# Patient Record
Sex: MALE | Race: WHITE | NOT HISPANIC OR LATINO | Employment: FULL TIME | URBAN - METROPOLITAN AREA
[De-identification: names, ages, dates, MRNs, and addresses within clinical notes are randomized per-mention and may not be internally consistent; named-entity substitution may affect disease eponyms.]

---

## 2017-01-01 ENCOUNTER — HOSPITAL ENCOUNTER (OUTPATIENT)
Dept: NON INVASIVE DIAGNOSTICS | Facility: CLINIC | Age: 52
Discharge: HOME | End: 2017-05-26
Payer: COMMERCIAL

## 2017-01-01 ENCOUNTER — HOSPITAL ENCOUNTER (OUTPATIENT)
Dept: INFUSION CENTER | Facility: CLINIC | Age: 52
Discharge: HOME | End: 2017-01-17
Payer: COMMERCIAL

## 2017-01-01 ENCOUNTER — TRANSCRIBE ORDERS (OUTPATIENT)
Dept: ADMINISTRATIVE | Facility: HOSPITAL | Age: 52
End: 2017-01-01

## 2017-01-01 ENCOUNTER — ALLSCRIPTS OFFICE VISIT (OUTPATIENT)
Dept: OTHER | Facility: OTHER | Age: 52
End: 2017-01-01

## 2017-01-01 ENCOUNTER — HOSPITAL ENCOUNTER (OUTPATIENT)
Dept: INFUSION CENTER | Facility: CLINIC | Age: 52
Discharge: HOME | End: 2017-06-29
Payer: COMMERCIAL

## 2017-01-01 ENCOUNTER — APPOINTMENT (OUTPATIENT)
Dept: LAB | Facility: MEDICAL CENTER | Age: 52
End: 2017-01-01
Payer: COMMERCIAL

## 2017-01-01 ENCOUNTER — APPOINTMENT (INPATIENT)
Dept: RADIOLOGY | Facility: HOSPITAL | Age: 52
DRG: 871 | End: 2017-01-01
Payer: COMMERCIAL

## 2017-01-01 ENCOUNTER — HOSPITAL ENCOUNTER (OUTPATIENT)
Dept: CT IMAGING | Facility: HOSPITAL | Age: 52
Discharge: HOME | End: 2017-03-17
Payer: COMMERCIAL

## 2017-01-01 ENCOUNTER — HOSPITAL ENCOUNTER (OUTPATIENT)
Dept: INFUSION CENTER | Facility: CLINIC | Age: 52
Discharge: HOME | End: 2017-07-12
Payer: COMMERCIAL

## 2017-01-01 ENCOUNTER — HOSPITAL ENCOUNTER (OUTPATIENT)
Dept: INFUSION CENTER | Facility: CLINIC | Age: 52
Discharge: HOME | End: 2017-04-27
Payer: COMMERCIAL

## 2017-01-01 ENCOUNTER — HOSPITAL ENCOUNTER (OUTPATIENT)
Dept: INFUSION CENTER | Facility: CLINIC | Age: 52
Discharge: HOME | End: 2017-05-23
Payer: COMMERCIAL

## 2017-01-01 ENCOUNTER — APPOINTMENT (OUTPATIENT)
Dept: LAB | Facility: CLINIC | Age: 52
End: 2017-01-01
Payer: COMMERCIAL

## 2017-01-01 ENCOUNTER — APPOINTMENT (EMERGENCY)
Dept: RADIOLOGY | Facility: HOSPITAL | Age: 52
DRG: 871 | End: 2017-01-01
Payer: COMMERCIAL

## 2017-01-01 ENCOUNTER — APPOINTMENT (OUTPATIENT)
Dept: RADIATION ONCOLOGY | Facility: HOSPITAL | Age: 52
End: 2017-01-01
Payer: COMMERCIAL

## 2017-01-01 ENCOUNTER — HOSPITAL ENCOUNTER (OUTPATIENT)
Dept: INFUSION CENTER | Facility: CLINIC | Age: 52
Discharge: HOME | End: 2017-05-09
Payer: COMMERCIAL

## 2017-01-01 ENCOUNTER — GENERIC CONVERSION - ENCOUNTER (OUTPATIENT)
Dept: OTHER | Facility: OTHER | Age: 52
End: 2017-01-01

## 2017-01-01 ENCOUNTER — APPOINTMENT (EMERGENCY)
Dept: CT IMAGING | Facility: HOSPITAL | Age: 52
DRG: 064 | End: 2017-01-01
Payer: COMMERCIAL

## 2017-01-01 ENCOUNTER — TRANSCRIBE ORDERS (OUTPATIENT)
Dept: LAB | Facility: CLINIC | Age: 52
End: 2017-01-01

## 2017-01-01 ENCOUNTER — APPOINTMENT (OUTPATIENT)
Dept: RADIATION ONCOLOGY | Facility: HOSPITAL | Age: 52
End: 2017-01-01
Attending: RADIOLOGY
Payer: COMMERCIAL

## 2017-01-01 ENCOUNTER — HOSPITAL ENCOUNTER (OUTPATIENT)
Dept: INFUSION CENTER | Facility: CLINIC | Age: 52
Discharge: HOME | End: 2017-04-25
Payer: COMMERCIAL

## 2017-01-01 ENCOUNTER — HOSPITAL ENCOUNTER (OUTPATIENT)
Dept: MRI IMAGING | Facility: HOSPITAL | Age: 52
Discharge: HOME | End: 2017-03-23
Payer: COMMERCIAL

## 2017-01-01 ENCOUNTER — HOSPITAL ENCOUNTER (OUTPATIENT)
Dept: CT IMAGING | Facility: HOSPITAL | Age: 52
Discharge: HOME | End: 2017-05-15
Payer: COMMERCIAL

## 2017-01-01 ENCOUNTER — HOSPITAL ENCOUNTER (OUTPATIENT)
Dept: INFUSION CENTER | Facility: CLINIC | Age: 52
Discharge: HOME | End: 2017-07-26
Payer: COMMERCIAL

## 2017-01-01 ENCOUNTER — HOSPITAL ENCOUNTER (OUTPATIENT)
Dept: INFUSION CENTER | Facility: CLINIC | Age: 52
Discharge: HOME | End: 2017-02-14
Payer: COMMERCIAL

## 2017-01-01 ENCOUNTER — HOSPITAL ENCOUNTER (INPATIENT)
Facility: HOSPITAL | Age: 52
LOS: 1 days | DRG: 064 | End: 2017-08-12
Attending: EMERGENCY MEDICINE | Admitting: HOSPITALIST
Payer: COMMERCIAL

## 2017-01-01 ENCOUNTER — HOSPITAL ENCOUNTER (OUTPATIENT)
Dept: INFUSION CENTER | Facility: CLINIC | Age: 52
Discharge: HOME | End: 2017-01-05
Payer: COMMERCIAL

## 2017-01-01 ENCOUNTER — HOSPITAL ENCOUNTER (OUTPATIENT)
Dept: INFUSION CENTER | Facility: CLINIC | Age: 52
Discharge: HOME | End: 2017-01-31
Payer: COMMERCIAL

## 2017-01-01 ENCOUNTER — HOSPITAL ENCOUNTER (OUTPATIENT)
Dept: INFUSION CENTER | Facility: CLINIC | Age: 52
Discharge: HOME | End: 2017-06-08
Payer: COMMERCIAL

## 2017-01-01 ENCOUNTER — HOSPITAL ENCOUNTER (OUTPATIENT)
Dept: INFUSION CENTER | Facility: CLINIC | Age: 52
Discharge: HOME | End: 2017-03-13
Payer: COMMERCIAL

## 2017-01-01 ENCOUNTER — HOSPITAL ENCOUNTER (OUTPATIENT)
Dept: RADIOLOGY | Facility: HOSPITAL | Age: 52
Discharge: HOME | End: 2017-08-02
Attending: FAMILY MEDICINE
Payer: COMMERCIAL

## 2017-01-01 ENCOUNTER — HOSPITAL ENCOUNTER (OUTPATIENT)
Dept: INFUSION CENTER | Facility: CLINIC | Age: 52
Discharge: HOME | End: 2017-08-09
Payer: COMMERCIAL

## 2017-01-01 ENCOUNTER — HOSPITAL ENCOUNTER (OUTPATIENT)
Dept: CT IMAGING | Facility: HOSPITAL | Age: 52
Discharge: HOME | End: 2017-08-07
Attending: FAMILY MEDICINE
Payer: COMMERCIAL

## 2017-01-01 ENCOUNTER — HOSPITAL ENCOUNTER (OUTPATIENT)
Dept: INFUSION CENTER | Facility: CLINIC | Age: 52
Discharge: HOME | End: 2017-02-28
Payer: COMMERCIAL

## 2017-01-01 ENCOUNTER — HOSPITAL ENCOUNTER (OUTPATIENT)
Dept: MRI IMAGING | Facility: HOSPITAL | Age: 52
Discharge: HOME | End: 2017-03-17
Payer: COMMERCIAL

## 2017-01-01 ENCOUNTER — HOSPITAL ENCOUNTER (OUTPATIENT)
Dept: INFUSION CENTER | Facility: CLINIC | Age: 52
Discharge: HOME | End: 2017-06-06
Payer: COMMERCIAL

## 2017-01-01 ENCOUNTER — HOSPITAL ENCOUNTER (OUTPATIENT)
Dept: INFUSION CENTER | Facility: CLINIC | Age: 52
Discharge: HOME | End: 2017-06-27
Payer: COMMERCIAL

## 2017-01-01 ENCOUNTER — HOSPITAL ENCOUNTER (OUTPATIENT)
Dept: NON INVASIVE DIAGNOSTICS | Facility: CLINIC | Age: 52
Discharge: HOME | End: 2017-02-02
Payer: COMMERCIAL

## 2017-01-01 ENCOUNTER — HOSPITAL ENCOUNTER (OUTPATIENT)
Dept: MRI IMAGING | Facility: HOSPITAL | Age: 52
Discharge: HOME | End: 2017-06-10
Attending: RADIOLOGY
Payer: COMMERCIAL

## 2017-01-01 ENCOUNTER — HOSPITAL ENCOUNTER (INPATIENT)
Facility: HOSPITAL | Age: 52
LOS: 5 days | Discharge: HOME | DRG: 871 | End: 2017-07-15
Attending: EMERGENCY MEDICINE | Admitting: INTERNAL MEDICINE
Payer: COMMERCIAL

## 2017-01-01 ENCOUNTER — APPOINTMENT (INPATIENT)
Dept: CT IMAGING | Facility: HOSPITAL | Age: 52
DRG: 871 | End: 2017-01-01
Payer: COMMERCIAL

## 2017-01-01 ENCOUNTER — HOSPITAL ENCOUNTER (OUTPATIENT)
Dept: INFUSION CENTER | Facility: CLINIC | Age: 52
Discharge: HOME | End: 2017-05-11
Payer: COMMERCIAL

## 2017-01-01 ENCOUNTER — HOSPITAL ENCOUNTER (OUTPATIENT)
Dept: INFUSION CENTER | Facility: CLINIC | Age: 52
Discharge: HOME | End: 2017-03-28
Payer: COMMERCIAL

## 2017-01-01 ENCOUNTER — HOSPITAL ENCOUNTER (OUTPATIENT)
Dept: INFUSION CENTER | Facility: CLINIC | Age: 52
Discharge: HOME | End: 2017-04-13
Payer: COMMERCIAL

## 2017-01-01 ENCOUNTER — HOSPITAL ENCOUNTER (OUTPATIENT)
Dept: CT IMAGING | Facility: HOSPITAL | Age: 52
Discharge: HOME | End: 2017-01-16
Payer: COMMERCIAL

## 2017-01-01 ENCOUNTER — APPOINTMENT (OUTPATIENT)
Dept: LAB | Facility: HOSPITAL | Age: 52
End: 2017-01-01
Attending: INTERNAL MEDICINE
Payer: COMMERCIAL

## 2017-01-01 ENCOUNTER — HOSPITAL ENCOUNTER (OUTPATIENT)
Dept: INFUSION CENTER | Facility: CLINIC | Age: 52
Discharge: HOME | End: 2017-07-28
Payer: COMMERCIAL

## 2017-01-01 ENCOUNTER — HOSPITAL ENCOUNTER (OUTPATIENT)
Dept: INFUSION CENTER | Facility: CLINIC | Age: 52
Discharge: HOME | End: 2017-05-25
Payer: COMMERCIAL

## 2017-01-01 ENCOUNTER — HOSPITAL ENCOUNTER (OUTPATIENT)
Dept: INFUSION CENTER | Facility: CLINIC | Age: 52
Discharge: HOME | End: 2017-04-11
Payer: COMMERCIAL

## 2017-01-01 ENCOUNTER — HOSPITAL ENCOUNTER (OUTPATIENT)
Dept: INFUSION CENTER | Facility: CLINIC | Age: 52
Discharge: HOME | End: 2017-03-30
Payer: COMMERCIAL

## 2017-01-01 ENCOUNTER — HOSPITAL ENCOUNTER (OUTPATIENT)
Dept: INFUSION CENTER | Facility: CLINIC | Age: 52
Discharge: HOME | End: 2017-01-19
Payer: COMMERCIAL

## 2017-01-01 ENCOUNTER — HOSPITAL ENCOUNTER (OUTPATIENT)
Dept: INFUSION CENTER | Facility: CLINIC | Age: 52
Discharge: HOME | End: 2017-01-03
Payer: COMMERCIAL

## 2017-01-01 VITALS
WEIGHT: 193.6 LBS | RESPIRATION RATE: 18 BRPM | TEMPERATURE: 98.1 F | HEART RATE: 104 BPM | OXYGEN SATURATION: 97 % | HEIGHT: 69 IN | BODY MASS INDEX: 28.68 KG/M2 | DIASTOLIC BLOOD PRESSURE: 70 MMHG | SYSTOLIC BLOOD PRESSURE: 110 MMHG

## 2017-01-01 VITALS
BODY MASS INDEX: 30.27 KG/M2 | SYSTOLIC BLOOD PRESSURE: 122 MMHG | HEIGHT: 69 IN | DIASTOLIC BLOOD PRESSURE: 62 MMHG | RESPIRATION RATE: 20 BRPM | TEMPERATURE: 97.6 F | HEART RATE: 78 BPM | WEIGHT: 204.4 LBS

## 2017-01-01 VITALS
OXYGEN SATURATION: 97 % | SYSTOLIC BLOOD PRESSURE: 122 MMHG | BODY MASS INDEX: 28.47 KG/M2 | RESPIRATION RATE: 18 BRPM | DIASTOLIC BLOOD PRESSURE: 68 MMHG | HEART RATE: 104 BPM | HEIGHT: 69 IN | WEIGHT: 192.24 LBS | TEMPERATURE: 98.6 F

## 2017-01-01 VITALS
WEIGHT: 204.6 LBS | RESPIRATION RATE: 16 BRPM | DIASTOLIC BLOOD PRESSURE: 64 MMHG | HEART RATE: 98 BPM | SYSTOLIC BLOOD PRESSURE: 96 MMHG | TEMPERATURE: 97.7 F | BODY MASS INDEX: 30.21 KG/M2 | OXYGEN SATURATION: 98 %

## 2017-01-01 VITALS
HEART RATE: 90 BPM | RESPIRATION RATE: 18 BRPM | WEIGHT: 177 LBS | HEIGHT: 69 IN | BODY MASS INDEX: 26.22 KG/M2 | TEMPERATURE: 97.6 F | DIASTOLIC BLOOD PRESSURE: 54 MMHG | SYSTOLIC BLOOD PRESSURE: 92 MMHG

## 2017-01-01 VITALS
SYSTOLIC BLOOD PRESSURE: 100 MMHG | TEMPERATURE: 97.6 F | DIASTOLIC BLOOD PRESSURE: 68 MMHG | WEIGHT: 165 LBS | HEIGHT: 70 IN | OXYGEN SATURATION: 90 % | BODY MASS INDEX: 23.62 KG/M2 | HEART RATE: 111 BPM | RESPIRATION RATE: 20 BRPM

## 2017-01-01 VITALS
RESPIRATION RATE: 34 BRPM | OXYGEN SATURATION: 77 % | HEART RATE: 150 BPM | BODY MASS INDEX: 24.49 KG/M2 | TEMPERATURE: 98.9 F | DIASTOLIC BLOOD PRESSURE: 78 MMHG | HEIGHT: 70 IN | SYSTOLIC BLOOD PRESSURE: 114 MMHG | WEIGHT: 171.08 LBS

## 2017-01-01 VITALS
DIASTOLIC BLOOD PRESSURE: 66 MMHG | HEIGHT: 69 IN | HEART RATE: 96 BPM | TEMPERATURE: 97.6 F | SYSTOLIC BLOOD PRESSURE: 116 MMHG | RESPIRATION RATE: 18 BRPM | WEIGHT: 192.2 LBS | BODY MASS INDEX: 28.47 KG/M2 | OXYGEN SATURATION: 98 %

## 2017-01-01 VITALS
HEIGHT: 69 IN | BODY MASS INDEX: 30.21 KG/M2 | SYSTOLIC BLOOD PRESSURE: 126 MMHG | HEART RATE: 100 BPM | WEIGHT: 204 LBS | TEMPERATURE: 97.9 F | DIASTOLIC BLOOD PRESSURE: 74 MMHG | RESPIRATION RATE: 18 BRPM

## 2017-01-01 VITALS
HEIGHT: 69 IN | OXYGEN SATURATION: 98 % | WEIGHT: 207 LBS | BODY MASS INDEX: 30.66 KG/M2 | DIASTOLIC BLOOD PRESSURE: 70 MMHG | SYSTOLIC BLOOD PRESSURE: 112 MMHG | HEART RATE: 99 BPM | RESPIRATION RATE: 16 BRPM | TEMPERATURE: 98.2 F

## 2017-01-01 VITALS
TEMPERATURE: 97.9 F | WEIGHT: 198.2 LBS | RESPIRATION RATE: 20 BRPM | BODY MASS INDEX: 29.36 KG/M2 | OXYGEN SATURATION: 97 % | SYSTOLIC BLOOD PRESSURE: 108 MMHG | HEIGHT: 69 IN | DIASTOLIC BLOOD PRESSURE: 60 MMHG | HEART RATE: 101 BPM

## 2017-01-01 VITALS
WEIGHT: 195 LBS | HEART RATE: 72 BPM | RESPIRATION RATE: 20 BRPM | TEMPERATURE: 98.1 F | DIASTOLIC BLOOD PRESSURE: 62 MMHG | OXYGEN SATURATION: 98 % | BODY MASS INDEX: 28.88 KG/M2 | SYSTOLIC BLOOD PRESSURE: 108 MMHG | HEIGHT: 69 IN

## 2017-01-01 VITALS
BODY MASS INDEX: 30.66 KG/M2 | TEMPERATURE: 97.6 F | HEART RATE: 100 BPM | SYSTOLIC BLOOD PRESSURE: 106 MMHG | WEIGHT: 207 LBS | RESPIRATION RATE: 18 BRPM | DIASTOLIC BLOOD PRESSURE: 66 MMHG | HEIGHT: 69 IN

## 2017-01-01 VITALS
BODY MASS INDEX: 27.1 KG/M2 | WEIGHT: 182.98 LBS | RESPIRATION RATE: 18 BRPM | HEIGHT: 69 IN | TEMPERATURE: 97.8 F | SYSTOLIC BLOOD PRESSURE: 124 MMHG | OXYGEN SATURATION: 91 % | DIASTOLIC BLOOD PRESSURE: 77 MMHG | HEART RATE: 125 BPM

## 2017-01-01 VITALS
HEART RATE: 96 BPM | WEIGHT: 208 LBS | DIASTOLIC BLOOD PRESSURE: 62 MMHG | HEIGHT: 70 IN | TEMPERATURE: 98 F | RESPIRATION RATE: 16 BRPM | OXYGEN SATURATION: 98 % | SYSTOLIC BLOOD PRESSURE: 102 MMHG | BODY MASS INDEX: 29.78 KG/M2

## 2017-01-01 VITALS
BODY MASS INDEX: 29.55 KG/M2 | RESPIRATION RATE: 20 BRPM | TEMPERATURE: 98.1 F | DIASTOLIC BLOOD PRESSURE: 64 MMHG | HEIGHT: 69 IN | WEIGHT: 199.5 LBS | SYSTOLIC BLOOD PRESSURE: 114 MMHG | HEART RATE: 95 BPM

## 2017-01-01 VITALS
HEART RATE: 99 BPM | SYSTOLIC BLOOD PRESSURE: 112 MMHG | WEIGHT: 204.6 LBS | DIASTOLIC BLOOD PRESSURE: 56 MMHG | HEIGHT: 69 IN | BODY MASS INDEX: 30.3 KG/M2 | TEMPERATURE: 97.9 F | RESPIRATION RATE: 18 BRPM

## 2017-01-01 DIAGNOSIS — R06.02 SOB (SHORTNESS OF BREATH): ICD-10-CM

## 2017-01-01 DIAGNOSIS — Z98.890 OTHER SPECIFIED POSTPROCEDURAL STATES: ICD-10-CM

## 2017-01-01 DIAGNOSIS — C79.51 SECONDARY MALIGNANT NEOPLASM OF BONE (HCC): ICD-10-CM

## 2017-01-01 DIAGNOSIS — C16.9 MALIGNANT NEOPLASM OF STOMACH (HCC): ICD-10-CM

## 2017-01-01 DIAGNOSIS — C16.9 MALIGNANT NEOPLASM OF STOMACH, UNSPECIFIED LOCATION (HCC): Primary | ICD-10-CM

## 2017-01-01 DIAGNOSIS — C77.9 MALIGNANT NEOPLASM METASTATIC TO LYMPH NODES, UNSPECIFIED LYMPH NODE REGION (HCC): ICD-10-CM

## 2017-01-01 DIAGNOSIS — C16.9 MALIGNANT NEOPLASM OF STOMACH, UNSPECIFIED SITE: Primary | ICD-10-CM

## 2017-01-01 DIAGNOSIS — J18.9 PNEUMONIA: Primary | ICD-10-CM

## 2017-01-01 DIAGNOSIS — J18.9 PNEUMONIA OF RIGHT LOWER LOBE DUE TO INFECTIOUS ORGANISM: Primary | ICD-10-CM

## 2017-01-01 DIAGNOSIS — R05.9 COUGH: ICD-10-CM

## 2017-01-01 DIAGNOSIS — J96.01 ACUTE RESPIRATORY FAILURE WITH HYPOXIA (HCC): ICD-10-CM

## 2017-01-01 DIAGNOSIS — C79.9 METASTATIC CANCER (HCC): Primary | ICD-10-CM

## 2017-01-01 DIAGNOSIS — C16.9 MALIGNANT NEOPLASM OF STOMACH, UNSPECIFIED SITE: ICD-10-CM

## 2017-01-01 DIAGNOSIS — J18.9 PNEUMONIA: ICD-10-CM

## 2017-01-01 DIAGNOSIS — C77.9 MALIGNANT NEOPLASM METASTATIC TO LYMPH NODES, UNSPECIFIED LYMPH NODE REGION (HCC): Primary | ICD-10-CM

## 2017-01-01 DIAGNOSIS — C16.9 GASTRIC CANCER (HCC): ICD-10-CM

## 2017-01-01 DIAGNOSIS — J96.90 RESPIRATORY FAILURE (HCC): ICD-10-CM

## 2017-01-01 DIAGNOSIS — K21.9 GASTRO-ESOPHAGEAL REFLUX DISEASE WITHOUT ESOPHAGITIS: ICD-10-CM

## 2017-01-01 DIAGNOSIS — T45.1X5A ADVERSE EFFECT OF ANTINEOPLASTIC OR IMMUNOSUPPRESSIVE DRUG: ICD-10-CM

## 2017-01-01 DIAGNOSIS — G89.3 NEOPLASM RELATED PAIN: ICD-10-CM

## 2017-01-01 DIAGNOSIS — R06.02 SOB (SHORTNESS OF BREATH): Primary | ICD-10-CM

## 2017-01-01 DIAGNOSIS — M25.512 LEFT SHOULDER PAIN, UNSPECIFIED CHRONICITY: Primary | ICD-10-CM

## 2017-01-01 DIAGNOSIS — M25.512 LEFT SHOULDER PAIN, UNSPECIFIED CHRONICITY: ICD-10-CM

## 2017-01-01 DIAGNOSIS — A41.9 SEPSIS, DUE TO UNSPECIFIED ORGANISM: ICD-10-CM

## 2017-01-01 DIAGNOSIS — J98.4 METHOTREXATE LUNG: ICD-10-CM

## 2017-01-01 DIAGNOSIS — R05.9 COUGH: Primary | ICD-10-CM

## 2017-01-01 DIAGNOSIS — T45.1X5A METHOTREXATE LUNG: ICD-10-CM

## 2017-01-01 LAB
ABO GROUP BLD BPU: NORMAL
ABO GROUP BLD: NORMAL
ALBUMIN SERPL BCP-MCNC: 1.8 G/DL (ref 3.5–5)
ALBUMIN SERPL BCP-MCNC: 2.3 G/DL (ref 3.5–5)
ALBUMIN SERPL BCP-MCNC: 2.3 G/DL (ref 3.5–5)
ALBUMIN SERPL BCP-MCNC: 2.5 G/DL (ref 3.5–5)
ALBUMIN SERPL BCP-MCNC: 3.2 G/DL (ref 3.5–5)
ALBUMIN SERPL BCP-MCNC: 3.2 G/DL (ref 3.5–5)
ALBUMIN SERPL BCP-MCNC: 3.3 G/DL (ref 3.5–5)
ALBUMIN SERPL BCP-MCNC: 3.3 G/DL (ref 3.5–5)
ALBUMIN SERPL BCP-MCNC: 3.4 G/DL (ref 3.5–5)
ALBUMIN SERPL BCP-MCNC: 3.5 G/DL (ref 3.5–5)
ALBUMIN SERPL BCP-MCNC: 3.6 G/DL (ref 3.5–5)
ALBUMIN SERPL BCP-MCNC: 3.7 G/DL (ref 3.5–5)
ALBUMIN SERPL BCP-MCNC: 3.8 G/DL (ref 3.5–5)
ALP SERPL-CCNC: 119 U/L (ref 46–116)
ALP SERPL-CCNC: 120 U/L (ref 46–116)
ALP SERPL-CCNC: 123 U/L (ref 46–116)
ALP SERPL-CCNC: 128 U/L (ref 46–116)
ALP SERPL-CCNC: 140 U/L (ref 46–116)
ALP SERPL-CCNC: 146 U/L (ref 46–116)
ALP SERPL-CCNC: 146 U/L (ref 46–116)
ALP SERPL-CCNC: 147 U/L (ref 46–116)
ALP SERPL-CCNC: 148 U/L (ref 46–116)
ALP SERPL-CCNC: 153 U/L (ref 46–116)
ALP SERPL-CCNC: 155 U/L (ref 46–116)
ALP SERPL-CCNC: 160 U/L (ref 46–116)
ALP SERPL-CCNC: 180 U/L (ref 46–116)
ALP SERPL-CCNC: 188 U/L (ref 46–116)
ALP SERPL-CCNC: 221 U/L (ref 46–116)
ALP SERPL-CCNC: 260 U/L (ref 46–116)
ALT SERPL W P-5'-P-CCNC: 23 U/L (ref 12–78)
ALT SERPL W P-5'-P-CCNC: 24 U/L (ref 12–78)
ALT SERPL W P-5'-P-CCNC: 25 U/L (ref 12–78)
ALT SERPL W P-5'-P-CCNC: 27 U/L (ref 12–78)
ALT SERPL W P-5'-P-CCNC: 29 U/L (ref 12–78)
ALT SERPL W P-5'-P-CCNC: 29 U/L (ref 12–78)
ALT SERPL W P-5'-P-CCNC: 30 U/L (ref 12–78)
ALT SERPL W P-5'-P-CCNC: 31 U/L (ref 12–78)
ALT SERPL W P-5'-P-CCNC: 34 U/L (ref 12–78)
ALT SERPL W P-5'-P-CCNC: 35 U/L (ref 12–78)
ALT SERPL W P-5'-P-CCNC: 36 U/L (ref 12–78)
ALT SERPL W P-5'-P-CCNC: 36 U/L (ref 12–78)
ALT SERPL W P-5'-P-CCNC: 38 U/L (ref 12–78)
ALT SERPL W P-5'-P-CCNC: 58 U/L (ref 12–78)
ANION GAP BLD CALC-SCNC: 20 MMOL/L (ref 4–13)
ANION GAP SERPL CALCULATED.3IONS-SCNC: 10 MMOL/L (ref 4–13)
ANION GAP SERPL CALCULATED.3IONS-SCNC: 11 MMOL/L (ref 4–13)
ANION GAP SERPL CALCULATED.3IONS-SCNC: 11 MMOL/L (ref 4–13)
ANION GAP SERPL CALCULATED.3IONS-SCNC: 12 MMOL/L (ref 4–13)
ANION GAP SERPL CALCULATED.3IONS-SCNC: 18 MMOL/L (ref 4–13)
ANION GAP SERPL CALCULATED.3IONS-SCNC: 6 MMOL/L (ref 4–13)
ANION GAP SERPL CALCULATED.3IONS-SCNC: 7 MMOL/L (ref 4–13)
ANION GAP SERPL CALCULATED.3IONS-SCNC: 8 MMOL/L (ref 4–13)
ANION GAP SERPL CALCULATED.3IONS-SCNC: 9 MMOL/L (ref 4–13)
ANISOCYTOSIS BLD QL SMEAR: PRESENT
APTT PPP: 34 SECONDS (ref 23–35)
APTT PPP: 37 SECONDS (ref 23–35)
ARTERIAL PATENCY WRIST A: ABNORMAL
AST SERPL W P-5'-P-CCNC: 16 U/L (ref 5–45)
AST SERPL W P-5'-P-CCNC: 162 U/L (ref 5–45)
AST SERPL W P-5'-P-CCNC: 17 U/L (ref 5–45)
AST SERPL W P-5'-P-CCNC: 19 U/L (ref 5–45)
AST SERPL W P-5'-P-CCNC: 19 U/L (ref 5–45)
AST SERPL W P-5'-P-CCNC: 22 U/L (ref 5–45)
AST SERPL W P-5'-P-CCNC: 23 U/L (ref 5–45)
AST SERPL W P-5'-P-CCNC: 25 U/L (ref 5–45)
AST SERPL W P-5'-P-CCNC: 26 U/L (ref 5–45)
AST SERPL W P-5'-P-CCNC: 27 U/L (ref 5–45)
AST SERPL W P-5'-P-CCNC: 33 U/L (ref 5–45)
AST SERPL W P-5'-P-CCNC: 65 U/L (ref 5–45)
AST SERPL W P-5'-P-CCNC: 89 U/L (ref 5–45)
AST SERPL W P-5'-P-CCNC: 95 U/L (ref 5–45)
ATRIAL RATE: 128 BPM
ATRIAL RATE: 141 BPM
BACTERIA BLD CULT: NORMAL
BACTERIA BLD CULT: NORMAL
BASE EXCESS BLDA CALC-SCNC: -1 MMOL/L (ref -2–3)
BASE EXCESS BLDA CALC-SCNC: -1 MMOL/L (ref -2–3)
BASE EXCESS BLDA CALC-SCNC: -4 MMOL/L (ref -2–3)
BASOPHILS # BLD AUTO: 0 THOUSANDS/ΜL (ref 0–0.1)
BASOPHILS # BLD AUTO: 0.01 THOUSANDS/ΜL (ref 0–0.1)
BASOPHILS # BLD AUTO: 0.02 THOUSANDS/ΜL (ref 0–0.1)
BASOPHILS # BLD MANUAL: 0 THOUSAND/UL (ref 0–0.1)
BASOPHILS NFR BLD AUTO: 0 % (ref 0–1)
BASOPHILS NFR MAR MANUAL: 0 % (ref 0–1)
BILIRUB SERPL-MCNC: 0.2 MG/DL (ref 0.2–1)
BILIRUB SERPL-MCNC: 0.25 MG/DL (ref 0.2–1)
BILIRUB SERPL-MCNC: 0.29 MG/DL (ref 0.2–1)
BILIRUB SERPL-MCNC: 0.29 MG/DL (ref 0.2–1)
BILIRUB SERPL-MCNC: 0.3 MG/DL (ref 0.2–1)
BILIRUB SERPL-MCNC: 0.3 MG/DL (ref 0.2–1)
BILIRUB SERPL-MCNC: 0.31 MG/DL (ref 0.2–1)
BILIRUB SERPL-MCNC: 0.33 MG/DL (ref 0.2–1)
BILIRUB SERPL-MCNC: 0.34 MG/DL (ref 0.2–1)
BILIRUB SERPL-MCNC: 0.35 MG/DL (ref 0.2–1)
BILIRUB SERPL-MCNC: 0.4 MG/DL (ref 0.2–1)
BILIRUB SERPL-MCNC: 0.46 MG/DL (ref 0.2–1)
BILIRUB SERPL-MCNC: 0.7 MG/DL (ref 0.2–1)
BLD GP AB SCN SERPL QL: NEGATIVE
BPU ID: NORMAL
BUN BLD-MCNC: 19 MG/DL (ref 5–25)
BUN SERPL-MCNC: 11 MG/DL (ref 5–25)
BUN SERPL-MCNC: 12 MG/DL (ref 5–25)
BUN SERPL-MCNC: 12 MG/DL (ref 5–25)
BUN SERPL-MCNC: 13 MG/DL (ref 5–25)
BUN SERPL-MCNC: 14 MG/DL (ref 5–25)
BUN SERPL-MCNC: 14 MG/DL (ref 5–25)
BUN SERPL-MCNC: 15 MG/DL (ref 5–25)
BUN SERPL-MCNC: 16 MG/DL (ref 5–25)
BUN SERPL-MCNC: 16 MG/DL (ref 5–25)
BUN SERPL-MCNC: 17 MG/DL (ref 5–25)
BUN SERPL-MCNC: 18 MG/DL (ref 5–25)
BUN SERPL-MCNC: 18 MG/DL (ref 5–25)
BUN SERPL-MCNC: 20 MG/DL (ref 5–25)
BUN SERPL-MCNC: 23 MG/DL (ref 5–25)
BUN SERPL-MCNC: 9 MG/DL (ref 5–25)
BUN SERPL-MCNC: 9 MG/DL (ref 5–25)
BURR CELLS BLD QL SMEAR: PRESENT
CA-I BLD-SCNC: 0.95 MMOL/L (ref 1.12–1.32)
CA-I BLD-SCNC: 0.95 MMOL/L (ref 1.12–1.32)
CA-I BLD-SCNC: 0.96 MMOL/L (ref 1.12–1.32)
CA-I BLD-SCNC: 1.04 MMOL/L (ref 1.12–1.32)
CALCIUM SERPL-MCNC: 6.8 MG/DL (ref 8.3–10.1)
CALCIUM SERPL-MCNC: 6.8 MG/DL (ref 8.3–10.1)
CALCIUM SERPL-MCNC: 7 MG/DL (ref 8.3–10.1)
CALCIUM SERPL-MCNC: 7.3 MG/DL (ref 8.3–10.1)
CALCIUM SERPL-MCNC: 8 MG/DL (ref 8.3–10.1)
CALCIUM SERPL-MCNC: 8.1 MG/DL (ref 8.3–10.1)
CALCIUM SERPL-MCNC: 8.1 MG/DL (ref 8.3–10.1)
CALCIUM SERPL-MCNC: 8.6 MG/DL (ref 8.3–10.1)
CALCIUM SERPL-MCNC: 8.7 MG/DL (ref 8.3–10.1)
CALCIUM SERPL-MCNC: 8.7 MG/DL (ref 8.3–10.1)
CALCIUM SERPL-MCNC: 8.9 MG/DL (ref 8.3–10.1)
CALCIUM SERPL-MCNC: 8.9 MG/DL (ref 8.3–10.1)
CALCIUM SERPL-MCNC: 9 MG/DL (ref 8.3–10.1)
CALCIUM SERPL-MCNC: 9.1 MG/DL (ref 8.3–10.1)
CALCIUM SERPL-MCNC: 9.5 MG/DL (ref 8.3–10.1)
CALCIUM SERPL-MCNC: 9.6 MG/DL (ref 8.3–10.1)
CHLORIDE BLD-SCNC: 103 MMOL/L (ref 100–108)
CHLORIDE SERPL-SCNC: 100 MMOL/L (ref 100–108)
CHLORIDE SERPL-SCNC: 101 MMOL/L (ref 100–108)
CHLORIDE SERPL-SCNC: 102 MMOL/L (ref 100–108)
CHLORIDE SERPL-SCNC: 103 MMOL/L (ref 100–108)
CHLORIDE SERPL-SCNC: 104 MMOL/L (ref 100–108)
CHLORIDE SERPL-SCNC: 106 MMOL/L (ref 100–108)
CHLORIDE SERPL-SCNC: 108 MMOL/L (ref 100–108)
CHLORIDE SERPL-SCNC: 96 MMOL/L (ref 100–108)
CHLORIDE SERPL-SCNC: 96 MMOL/L (ref 100–108)
CHLORIDE SERPL-SCNC: 98 MMOL/L (ref 100–108)
CHLORIDE SERPL-SCNC: 99 MMOL/L (ref 100–108)
CHLORIDE SERPL-SCNC: 99 MMOL/L (ref 100–108)
CLARITY, POC: CLEAR
CO2 SERPL-SCNC: 19 MMOL/L (ref 21–32)
CO2 SERPL-SCNC: 19 MMOL/L (ref 21–32)
CO2 SERPL-SCNC: 23 MMOL/L (ref 21–32)
CO2 SERPL-SCNC: 24 MMOL/L (ref 21–32)
CO2 SERPL-SCNC: 24 MMOL/L (ref 21–32)
CO2 SERPL-SCNC: 25 MMOL/L (ref 21–32)
CO2 SERPL-SCNC: 25 MMOL/L (ref 21–32)
CO2 SERPL-SCNC: 26 MMOL/L (ref 21–32)
CO2 SERPL-SCNC: 27 MMOL/L (ref 21–32)
CO2 SERPL-SCNC: 28 MMOL/L (ref 21–32)
CO2 SERPL-SCNC: 28 MMOL/L (ref 21–32)
CO2 SERPL-SCNC: 29 MMOL/L (ref 21–32)
CO2 SERPL-SCNC: 29 MMOL/L (ref 21–32)
COLOR, POC: ABNORMAL
CREAT BLD-MCNC: 1.1 MG/DL (ref 0.6–1.3)
CREAT SERPL-MCNC: 0.64 MG/DL (ref 0.6–1.3)
CREAT SERPL-MCNC: 0.71 MG/DL (ref 0.6–1.3)
CREAT SERPL-MCNC: 0.73 MG/DL (ref 0.6–1.3)
CREAT SERPL-MCNC: 0.75 MG/DL (ref 0.6–1.3)
CREAT SERPL-MCNC: 0.76 MG/DL (ref 0.6–1.3)
CREAT SERPL-MCNC: 0.79 MG/DL (ref 0.6–1.3)
CREAT SERPL-MCNC: 0.82 MG/DL (ref 0.6–1.3)
CREAT SERPL-MCNC: 0.83 MG/DL (ref 0.6–1.3)
CREAT SERPL-MCNC: 0.83 MG/DL (ref 0.6–1.3)
CREAT SERPL-MCNC: 0.85 MG/DL (ref 0.6–1.3)
CREAT SERPL-MCNC: 0.86 MG/DL (ref 0.6–1.3)
CREAT SERPL-MCNC: 0.86 MG/DL (ref 0.6–1.3)
CREAT SERPL-MCNC: 0.87 MG/DL (ref 0.6–1.3)
CREAT SERPL-MCNC: 0.88 MG/DL (ref 0.6–1.3)
CREAT SERPL-MCNC: 0.9 MG/DL (ref 0.6–1.3)
CREAT SERPL-MCNC: 0.9 MG/DL (ref 0.6–1.3)
CREAT SERPL-MCNC: 0.92 MG/DL (ref 0.6–1.3)
CREAT SERPL-MCNC: 0.95 MG/DL (ref 0.6–1.3)
CREAT SERPL-MCNC: 1.17 MG/DL (ref 0.6–1.3)
CREAT SERPL-MCNC: 1.27 MG/DL (ref 0.6–1.3)
CROSSMATCH: NORMAL
DOHLE BOD BLD QL SMEAR: PRESENT
DS:DELIVERY SYSTEM: ABNORMAL
EOSINOPHIL # BLD AUTO: 0.01 THOUSAND/ΜL (ref 0–0.61)
EOSINOPHIL # BLD AUTO: 0.04 THOUSAND/ΜL (ref 0–0.61)
EOSINOPHIL # BLD AUTO: 0.09 THOUSAND/ΜL (ref 0–0.61)
EOSINOPHIL # BLD AUTO: 0.11 THOUSAND/ΜL (ref 0–0.61)
EOSINOPHIL # BLD AUTO: 0.12 THOUSAND/ΜL (ref 0–0.61)
EOSINOPHIL # BLD AUTO: 0.13 THOUSAND/ΜL (ref 0–0.61)
EOSINOPHIL # BLD AUTO: 0.14 THOUSAND/ΜL (ref 0–0.61)
EOSINOPHIL # BLD AUTO: 0.15 THOUSAND/ΜL (ref 0–0.61)
EOSINOPHIL # BLD AUTO: 0.17 THOUSAND/ΜL (ref 0–0.61)
EOSINOPHIL # BLD AUTO: 0.17 THOUSAND/ΜL (ref 0–0.61)
EOSINOPHIL # BLD AUTO: 0.21 THOUSAND/ΜL (ref 0–0.61)
EOSINOPHIL # BLD AUTO: 0.21 THOUSAND/ΜL (ref 0–0.61)
EOSINOPHIL # BLD MANUAL: 0 THOUSAND/UL (ref 0–0.4)
EOSINOPHIL NFR BLD AUTO: 0 % (ref 0–6)
EOSINOPHIL NFR BLD AUTO: 0 % (ref 0–6)
EOSINOPHIL NFR BLD AUTO: 1 % (ref 0–6)
EOSINOPHIL NFR BLD AUTO: 2 % (ref 0–6)
EOSINOPHIL NFR BLD AUTO: 3 % (ref 0–6)
EOSINOPHIL NFR BLD AUTO: 3 % (ref 0–6)
EOSINOPHIL NFR BLD AUTO: 4 % (ref 0–6)
EOSINOPHIL NFR BLD AUTO: 5 % (ref 0–6)
EOSINOPHIL NFR BLD MANUAL: 0 % (ref 0–6)
ERYTHROCYTE [DISTWIDTH] IN BLOOD BY AUTOMATED COUNT: 14.2 % (ref 11.6–15.1)
ERYTHROCYTE [DISTWIDTH] IN BLOOD BY AUTOMATED COUNT: 14.5 % (ref 11.6–15.1)
ERYTHROCYTE [DISTWIDTH] IN BLOOD BY AUTOMATED COUNT: 14.8 % (ref 11.6–15.1)
ERYTHROCYTE [DISTWIDTH] IN BLOOD BY AUTOMATED COUNT: 14.9 % (ref 11.6–15.1)
ERYTHROCYTE [DISTWIDTH] IN BLOOD BY AUTOMATED COUNT: 14.9 % (ref 11.6–15.1)
ERYTHROCYTE [DISTWIDTH] IN BLOOD BY AUTOMATED COUNT: 15.2 % (ref 11.6–15.1)
ERYTHROCYTE [DISTWIDTH] IN BLOOD BY AUTOMATED COUNT: 15.2 % (ref 11.6–15.1)
ERYTHROCYTE [DISTWIDTH] IN BLOOD BY AUTOMATED COUNT: 15.5 % (ref 11.6–15.1)
ERYTHROCYTE [DISTWIDTH] IN BLOOD BY AUTOMATED COUNT: 16.4 % (ref 11.6–15.1)
ERYTHROCYTE [DISTWIDTH] IN BLOOD BY AUTOMATED COUNT: 17.2 % (ref 11.6–15.1)
ERYTHROCYTE [DISTWIDTH] IN BLOOD BY AUTOMATED COUNT: 18.7 % (ref 11.6–15.1)
ERYTHROCYTE [DISTWIDTH] IN BLOOD BY AUTOMATED COUNT: 18.8 % (ref 11.6–15.1)
ERYTHROCYTE [DISTWIDTH] IN BLOOD BY AUTOMATED COUNT: 19.7 % (ref 11.6–15.1)
ERYTHROCYTE [DISTWIDTH] IN BLOOD BY AUTOMATED COUNT: 19.7 % (ref 11.6–15.1)
ERYTHROCYTE [DISTWIDTH] IN BLOOD BY AUTOMATED COUNT: 20 % (ref 11.6–15.1)
ERYTHROCYTE [DISTWIDTH] IN BLOOD BY AUTOMATED COUNT: 20.4 % (ref 11.6–15.1)
ERYTHROCYTE [DISTWIDTH] IN BLOOD BY AUTOMATED COUNT: 20.5 % (ref 11.6–15.1)
ERYTHROCYTE [DISTWIDTH] IN BLOOD BY AUTOMATED COUNT: 20.7 % (ref 11.6–15.1)
ERYTHROCYTE [DISTWIDTH] IN BLOOD BY AUTOMATED COUNT: 20.9 % (ref 11.6–15.1)
ERYTHROCYTE [DISTWIDTH] IN BLOOD BY AUTOMATED COUNT: 21 % (ref 11.6–15.1)
EXT BILIRUBIN, UA: NEGATIVE
EXT BLOOD URINE: ABNORMAL
EXT GLUCOSE, UA: NEGATIVE
EXT KETONES: NEGATIVE
EXT NITRITE, UA: NEGATIVE
EXT PH, UA: 6
EXT PROTEIN, UA: 100
EXT SPECIFIC GRAVITY, UA: 1.02
EXT UROBILINOGEN: 0.2
FIO2 GAS DIL.REBREATH: 0.4 L
FIO2 GAS DIL.REBREATH: 40 L
FIO2 GAS DIL.REBREATH: 60 L
GFR SERPL CREATININE-BSD FRML MDRD: 59.3 ML/MIN/1.73SQ M
GFR SERPL CREATININE-BSD FRML MDRD: 71 ML/MIN/1.73SQ M
GFR SERPL CREATININE-BSD FRML MDRD: 76 ML/MIN/1.73SQ M
GFR SERPL CREATININE-BSD FRML MDRD: 91 ML/MIN/1.73SQ M
GFR SERPL CREATININE-BSD FRML MDRD: 99 ML/MIN/1.73SQ M
GFR SERPL CREATININE-BSD FRML MDRD: >60 ML/MIN/1.73SQ M
GIANT PLATELETS BLD QL SMEAR: PRESENT
GLUCOSE P FAST SERPL-MCNC: 110 MG/DL (ref 65–99)
GLUCOSE P FAST SERPL-MCNC: 99 MG/DL (ref 65–99)
GLUCOSE SERPL-MCNC: 102 MG/DL (ref 65–140)
GLUCOSE SERPL-MCNC: 110 MG/DL (ref 65–140)
GLUCOSE SERPL-MCNC: 112 MG/DL (ref 65–140)
GLUCOSE SERPL-MCNC: 114 MG/DL (ref 65–140)
GLUCOSE SERPL-MCNC: 120 MG/DL (ref 65–140)
GLUCOSE SERPL-MCNC: 128 MG/DL (ref 65–140)
GLUCOSE SERPL-MCNC: 131 MG/DL (ref 65–140)
GLUCOSE SERPL-MCNC: 135 MG/DL (ref 65–140)
GLUCOSE SERPL-MCNC: 140 MG/DL (ref 65–140)
GLUCOSE SERPL-MCNC: 147 MG/DL (ref 65–140)
GLUCOSE SERPL-MCNC: 78 MG/DL (ref 65–140)
GLUCOSE SERPL-MCNC: 82 MG/DL (ref 65–140)
GLUCOSE SERPL-MCNC: 82 MG/DL (ref 65–140)
GLUCOSE SERPL-MCNC: 87 MG/DL (ref 65–140)
GLUCOSE SERPL-MCNC: 88 MG/DL (ref 65–140)
GLUCOSE SERPL-MCNC: 89 MG/DL (ref 65–140)
GLUCOSE SERPL-MCNC: 89 MG/DL (ref 65–140)
GLUCOSE SERPL-MCNC: 92 MG/DL (ref 65–140)
GLUCOSE SERPL-MCNC: 93 MG/DL (ref 65–140)
GLUCOSE SERPL-MCNC: 94 MG/DL (ref 65–140)
GLUCOSE SERPL-MCNC: 94 MG/DL (ref 65–140)
GLUCOSE SERPL-MCNC: 96 MG/DL (ref 65–140)
HCO3 BLDA-SCNC: 19.6 MMOL/L (ref 22–28)
HCO3 BLDA-SCNC: 20.9 MMOL/L (ref 22–28)
HCO3 BLDA-SCNC: 21 MMOL/L (ref 22–28)
HCT VFR BLD AUTO: 22.3 % (ref 36.5–49.3)
HCT VFR BLD AUTO: 22.6 % (ref 36.5–49.3)
HCT VFR BLD AUTO: 24.8 % (ref 36.5–49.3)
HCT VFR BLD AUTO: 25.5 % (ref 36.5–49.3)
HCT VFR BLD AUTO: 27.1 % (ref 36.5–49.3)
HCT VFR BLD AUTO: 33.8 % (ref 36.5–49.3)
HCT VFR BLD AUTO: 34.3 % (ref 36.5–49.3)
HCT VFR BLD AUTO: 35.3 % (ref 36.5–49.3)
HCT VFR BLD AUTO: 38.5 % (ref 36.5–49.3)
HCT VFR BLD AUTO: 38.8 % (ref 36.5–49.3)
HCT VFR BLD AUTO: 40.1 % (ref 36.5–49.3)
HCT VFR BLD AUTO: 40.6 % (ref 36.5–49.3)
HCT VFR BLD AUTO: 40.7 % (ref 36.5–49.3)
HCT VFR BLD AUTO: 41.3 % (ref 36.5–49.3)
HCT VFR BLD AUTO: 41.6 % (ref 36.5–49.3)
HCT VFR BLD AUTO: 41.8 % (ref 36.5–49.3)
HCT VFR BLD AUTO: 41.9 % (ref 36.5–49.3)
HCT VFR BLD AUTO: 42.1 % (ref 36.5–49.3)
HCT VFR BLD AUTO: 42.2 % (ref 36.5–49.3)
HCT VFR BLD AUTO: 45.2 % (ref 36.5–49.3)
HCT VFR BLD CALC: 21 % (ref 36.5–49.3)
HCT VFR BLD CALC: 22 % (ref 36.5–49.3)
HCT VFR BLD CALC: 23 % (ref 36.5–49.3)
HCT VFR BLD CALC: 33 % (ref 36.5–49.3)
HGB BLD-MCNC: 10.4 G/DL (ref 12–17)
HGB BLD-MCNC: 10.6 G/DL (ref 12–17)
HGB BLD-MCNC: 11.2 G/DL (ref 12–17)
HGB BLD-MCNC: 12.8 G/DL (ref 12–17)
HGB BLD-MCNC: 13.1 G/DL (ref 12–17)
HGB BLD-MCNC: 13.7 G/DL (ref 12–17)
HGB BLD-MCNC: 13.8 G/DL (ref 12–17)
HGB BLD-MCNC: 13.9 G/DL (ref 12–17)
HGB BLD-MCNC: 13.9 G/DL (ref 12–17)
HGB BLD-MCNC: 14.2 G/DL (ref 12–17)
HGB BLD-MCNC: 14.3 G/DL (ref 12–17)
HGB BLD-MCNC: 14.4 G/DL (ref 12–17)
HGB BLD-MCNC: 15.3 G/DL (ref 12–17)
HGB BLD-MCNC: 7.3 G/DL (ref 12–17)
HGB BLD-MCNC: 7.3 G/DL (ref 12–17)
HGB BLD-MCNC: 7.4 G/DL (ref 12–17)
HGB BLD-MCNC: 8.2 G/DL (ref 12–17)
HGB BLD-MCNC: 8.3 G/DL (ref 12–17)
HGB BLD-MCNC: 8.7 G/DL (ref 12–17)
HGB BLD-MCNC: 9.1 G/DL (ref 12–17)
HGB BLDA-MCNC: 11.2 G/DL (ref 12–17)
HGB BLDA-MCNC: 7.1 G/DL (ref 12–17)
HGB BLDA-MCNC: 7.5 G/DL (ref 12–17)
HGB BLDA-MCNC: 7.8 G/DL (ref 12–17)
HYPERCHROMIA BLD QL SMEAR: PRESENT
HYPERCHROMIA BLD QL SMEAR: PRESENT
INR PPP: 1.41 (ref 0.86–1.16)
INR PPP: 1.85 (ref 0.86–1.16)
LACTATE SERPL-SCNC: 2 MMOL/L (ref 0.5–2)
LACTATE SERPL-SCNC: 3 MMOL/L (ref 0.5–2)
LACTATE SERPL-SCNC: 3.8 MMOL/L (ref 0.5–2)
LG PLATELETS BLD QL SMEAR: PRESENT
LYMPHOCYTES # BLD AUTO: 0 % (ref 14–44)
LYMPHOCYTES # BLD AUTO: 0 THOUSAND/UL (ref 0.6–4.47)
LYMPHOCYTES # BLD AUTO: 0.03 THOUSAND/UL (ref 0.6–4.47)
LYMPHOCYTES # BLD AUTO: 0.05 THOUSAND/UL (ref 0.6–4.47)
LYMPHOCYTES # BLD AUTO: 0.09 THOUSAND/UL (ref 0.6–4.47)
LYMPHOCYTES # BLD AUTO: 0.15 THOUSAND/UL (ref 0.6–4.47)
LYMPHOCYTES # BLD AUTO: 0.23 THOUSAND/UL (ref 0.6–4.47)
LYMPHOCYTES # BLD AUTO: 0.39 THOUSAND/UL (ref 0.6–4.47)
LYMPHOCYTES # BLD AUTO: 0.39 THOUSANDS/ΜL (ref 0.6–4.47)
LYMPHOCYTES # BLD AUTO: 0.56 THOUSAND/UL (ref 0.6–4.47)
LYMPHOCYTES # BLD AUTO: 0.6 THOUSANDS/ΜL (ref 0.6–4.47)
LYMPHOCYTES # BLD AUTO: 0.64 THOUSANDS/ΜL (ref 0.6–4.47)
LYMPHOCYTES # BLD AUTO: 0.74 THOUSANDS/ΜL (ref 0.6–4.47)
LYMPHOCYTES # BLD AUTO: 0.84 THOUSANDS/ΜL (ref 0.6–4.47)
LYMPHOCYTES # BLD AUTO: 0.85 THOUSANDS/ΜL (ref 0.6–4.47)
LYMPHOCYTES # BLD AUTO: 0.85 THOUSANDS/ΜL (ref 0.6–4.47)
LYMPHOCYTES # BLD AUTO: 0.89 THOUSANDS/ΜL (ref 0.6–4.47)
LYMPHOCYTES # BLD AUTO: 0.91 THOUSANDS/ΜL (ref 0.6–4.47)
LYMPHOCYTES # BLD AUTO: 0.96 THOUSANDS/ΜL (ref 0.6–4.47)
LYMPHOCYTES # BLD AUTO: 0.97 THOUSANDS/ΜL (ref 0.6–4.47)
LYMPHOCYTES # BLD AUTO: 0.99 THOUSANDS/ΜL (ref 0.6–4.47)
LYMPHOCYTES # BLD AUTO: 1 % (ref 14–44)
LYMPHOCYTES # BLD AUTO: 1 % (ref 14–44)
LYMPHOCYTES # BLD AUTO: 2 % (ref 14–44)
LYMPHOCYTES # BLD AUTO: 2 % (ref 14–44)
LYMPHOCYTES # BLD AUTO: 3 % (ref 14–44)
LYMPHOCYTES # BLD AUTO: 3 % (ref 14–44)
LYMPHOCYTES # BLD AUTO: 4 % (ref 14–44)
LYMPHOCYTES NFR BLD AUTO: 11 % (ref 14–44)
LYMPHOCYTES NFR BLD AUTO: 11 % (ref 14–44)
LYMPHOCYTES NFR BLD AUTO: 12 % (ref 14–44)
LYMPHOCYTES NFR BLD AUTO: 13 % (ref 14–44)
LYMPHOCYTES NFR BLD AUTO: 13 % (ref 14–44)
LYMPHOCYTES NFR BLD AUTO: 14 % (ref 14–44)
LYMPHOCYTES NFR BLD AUTO: 15 % (ref 14–44)
LYMPHOCYTES NFR BLD AUTO: 15 % (ref 14–44)
LYMPHOCYTES NFR BLD AUTO: 17 % (ref 14–44)
LYMPHOCYTES NFR BLD AUTO: 22 % (ref 14–44)
LYMPHOCYTES NFR BLD AUTO: 5 % (ref 14–44)
LYMPHOCYTES NFR BLD AUTO: 6 % (ref 14–44)
MACROCYTES BLD QL AUTO: PRESENT
MAGNESIUM SERPL-MCNC: 2.7 MG/DL (ref 1.6–2.6)
MAGNESIUM SERPL-MCNC: 2.8 MG/DL (ref 1.6–2.6)
MAGNESIUM SERPL-MCNC: 2.8 MG/DL (ref 1.6–2.6)
MCH RBC QN AUTO: 28.3 PG (ref 26.8–34.3)
MCH RBC QN AUTO: 28.4 PG (ref 26.8–34.3)
MCH RBC QN AUTO: 28.4 PG (ref 26.8–34.3)
MCH RBC QN AUTO: 28.6 PG (ref 26.8–34.3)
MCH RBC QN AUTO: 28.6 PG (ref 26.8–34.3)
MCH RBC QN AUTO: 28.8 PG (ref 26.8–34.3)
MCH RBC QN AUTO: 28.9 PG (ref 26.8–34.3)
MCH RBC QN AUTO: 29 PG (ref 26.8–34.3)
MCH RBC QN AUTO: 29 PG (ref 26.8–34.3)
MCH RBC QN AUTO: 29.1 PG (ref 26.8–34.3)
MCH RBC QN AUTO: 29.2 PG (ref 26.8–34.3)
MCH RBC QN AUTO: 29.5 PG (ref 26.8–34.3)
MCH RBC QN AUTO: 29.6 PG (ref 26.8–34.3)
MCH RBC QN AUTO: 30.1 PG (ref 26.8–34.3)
MCH RBC QN AUTO: 30.2 PG (ref 26.8–34.3)
MCH RBC QN AUTO: 30.7 PG (ref 26.8–34.3)
MCH RBC QN AUTO: 31.7 PG (ref 26.8–34.3)
MCH RBC QN AUTO: 32.3 PG (ref 26.8–34.3)
MCHC RBC AUTO-ENTMCNC: 30.8 G/DL (ref 31.4–37.4)
MCHC RBC AUTO-ENTMCNC: 30.9 G/DL (ref 31.4–37.4)
MCHC RBC AUTO-ENTMCNC: 31.7 G/DL (ref 31.4–37.4)
MCHC RBC AUTO-ENTMCNC: 32.1 G/DL (ref 31.4–37.4)
MCHC RBC AUTO-ENTMCNC: 32.2 G/DL (ref 31.4–37.4)
MCHC RBC AUTO-ENTMCNC: 32.3 G/DL (ref 31.4–37.4)
MCHC RBC AUTO-ENTMCNC: 32.7 G/DL (ref 31.4–37.4)
MCHC RBC AUTO-ENTMCNC: 33.2 G/DL (ref 31.4–37.4)
MCHC RBC AUTO-ENTMCNC: 33.2 G/DL (ref 31.4–37.4)
MCHC RBC AUTO-ENTMCNC: 33.3 G/DL (ref 31.4–37.4)
MCHC RBC AUTO-ENTMCNC: 33.4 G/DL (ref 31.4–37.4)
MCHC RBC AUTO-ENTMCNC: 33.5 G/DL (ref 31.4–37.4)
MCHC RBC AUTO-ENTMCNC: 33.6 G/DL (ref 31.4–37.4)
MCHC RBC AUTO-ENTMCNC: 33.7 G/DL (ref 31.4–37.4)
MCHC RBC AUTO-ENTMCNC: 33.8 G/DL (ref 31.4–37.4)
MCHC RBC AUTO-ENTMCNC: 33.8 G/DL (ref 31.4–37.4)
MCHC RBC AUTO-ENTMCNC: 34 G/DL (ref 31.4–37.4)
MCHC RBC AUTO-ENTMCNC: 34.1 G/DL (ref 31.4–37.4)
MCHC RBC AUTO-ENTMCNC: 34.2 G/DL (ref 31.4–37.4)
MCHC RBC AUTO-ENTMCNC: 34.2 G/DL (ref 31.4–37.4)
MCV RBC AUTO: 85 FL (ref 82–98)
MCV RBC AUTO: 86 FL (ref 82–98)
MCV RBC AUTO: 87 FL (ref 82–98)
MCV RBC AUTO: 88 FL (ref 82–98)
MCV RBC AUTO: 88 FL (ref 82–98)
MCV RBC AUTO: 89 FL (ref 82–98)
MCV RBC AUTO: 89 FL (ref 82–98)
MCV RBC AUTO: 90 FL (ref 82–98)
MCV RBC AUTO: 91 FL (ref 82–98)
MCV RBC AUTO: 91 FL (ref 82–98)
MCV RBC AUTO: 92 FL (ref 82–98)
MCV RBC AUTO: 92 FL (ref 82–98)
MCV RBC AUTO: 93 FL (ref 82–98)
MCV RBC AUTO: 94 FL (ref 82–98)
MCV RBC AUTO: 95 FL (ref 82–98)
METAMYELOCYTES NFR BLD MANUAL: 1 % (ref 0–1)
METAMYELOCYTES NFR BLD MANUAL: 2 % (ref 0–1)
METAMYELOCYTES NFR BLD MANUAL: 2 % (ref 0–1)
MONOCYTES # BLD AUTO: 0.03 THOUSAND/UL (ref 0–1.22)
MONOCYTES # BLD AUTO: 0.09 THOUSAND/UL (ref 0–1.22)
MONOCYTES # BLD AUTO: 0.15 THOUSAND/UL (ref 0–1.22)
MONOCYTES # BLD AUTO: 0.16 THOUSAND/UL (ref 0–1.22)
MONOCYTES # BLD AUTO: 0.19 THOUSAND/UL (ref 0–1.22)
MONOCYTES # BLD AUTO: 0.29 THOUSAND/UL (ref 0–1.22)
MONOCYTES # BLD AUTO: 0.46 THOUSAND/ΜL (ref 0.17–1.22)
MONOCYTES # BLD AUTO: 0.61 THOUSAND/ΜL (ref 0.17–1.22)
MONOCYTES # BLD AUTO: 0.69 THOUSAND/UL (ref 0–1.22)
MONOCYTES # BLD AUTO: 0.71 THOUSAND/ΜL (ref 0.17–1.22)
MONOCYTES # BLD AUTO: 0.72 THOUSAND/UL (ref 0–1.22)
MONOCYTES # BLD AUTO: 0.79 THOUSAND/ΜL (ref 0.17–1.22)
MONOCYTES # BLD AUTO: 0.84 THOUSAND/ΜL (ref 0.17–1.22)
MONOCYTES # BLD AUTO: 0.87 THOUSAND/ΜL (ref 0.17–1.22)
MONOCYTES # BLD AUTO: 0.89 THOUSAND/ΜL (ref 0.17–1.22)
MONOCYTES # BLD AUTO: 0.9 THOUSAND/ΜL (ref 0.17–1.22)
MONOCYTES # BLD AUTO: 0.91 THOUSAND/ΜL (ref 0.17–1.22)
MONOCYTES # BLD AUTO: 0.96 THOUSAND/ΜL (ref 0.17–1.22)
MONOCYTES # BLD AUTO: 0.99 THOUSAND/ΜL (ref 0.17–1.22)
MONOCYTES # BLD AUTO: 1.12 THOUSAND/ΜL (ref 0.17–1.22)
MONOCYTES NFR BLD AUTO: 10 % (ref 4–12)
MONOCYTES NFR BLD AUTO: 10 % (ref 4–12)
MONOCYTES NFR BLD AUTO: 12 % (ref 4–12)
MONOCYTES NFR BLD AUTO: 12 % (ref 4–12)
MONOCYTES NFR BLD AUTO: 13 % (ref 4–12)
MONOCYTES NFR BLD AUTO: 14 % (ref 4–12)
MONOCYTES NFR BLD AUTO: 16 % (ref 4–12)
MONOCYTES NFR BLD AUTO: 16 % (ref 4–12)
MONOCYTES NFR BLD AUTO: 20 % (ref 4–12)
MONOCYTES NFR BLD AUTO: 20 % (ref 4–12)
MONOCYTES NFR BLD AUTO: 6 % (ref 4–12)
MONOCYTES NFR BLD AUTO: 8 % (ref 4–12)
MONOCYTES NFR BLD: 1 % (ref 4–12)
MONOCYTES NFR BLD: 1 % (ref 4–12)
MONOCYTES NFR BLD: 2 % (ref 4–12)
MONOCYTES NFR BLD: 3 % (ref 4–12)
MONOCYTES NFR BLD: 4 % (ref 4–12)
MONOCYTES NFR BLD: 6 % (ref 4–12)
MRSA NOSE QL CULT: NORMAL
MYELOCYTES NFR BLD MANUAL: 1 % (ref 0–1)
MYELOCYTES NFR BLD MANUAL: 3 % (ref 0–1)
NEUTROPHILS # BLD AUTO: 2.36 THOUSANDS/ΜL (ref 1.85–7.62)
NEUTROPHILS # BLD AUTO: 2.92 THOUSANDS/ΜL (ref 1.85–7.62)
NEUTROPHILS # BLD AUTO: 3.59 THOUSANDS/ΜL (ref 1.85–7.62)
NEUTROPHILS # BLD AUTO: 3.99 THOUSANDS/ΜL (ref 1.85–7.62)
NEUTROPHILS # BLD AUTO: 4.23 THOUSANDS/ΜL (ref 1.85–7.62)
NEUTROPHILS # BLD AUTO: 4.8 THOUSANDS/ΜL (ref 1.85–7.62)
NEUTROPHILS # BLD AUTO: 4.91 THOUSANDS/ΜL (ref 1.85–7.62)
NEUTROPHILS # BLD AUTO: 5.16 THOUSANDS/ΜL (ref 1.85–7.62)
NEUTROPHILS # BLD AUTO: 5.3 THOUSANDS/ΜL (ref 1.85–7.62)
NEUTROPHILS # BLD AUTO: 6.72 THOUSANDS/ΜL (ref 1.85–7.62)
NEUTROPHILS # BLD AUTO: 7.01 THOUSANDS/ΜL (ref 1.85–7.62)
NEUTROPHILS # BLD AUTO: 9.07 THOUSANDS/ΜL (ref 1.85–7.62)
NEUTROPHILS # BLD MANUAL: 10.6 THOUSAND/UL (ref 1.85–7.62)
NEUTROPHILS # BLD MANUAL: 16.71 THOUSAND/UL (ref 1.85–7.62)
NEUTROPHILS # BLD MANUAL: 17.13 THOUSAND/UL (ref 1.85–7.62)
NEUTROPHILS # BLD MANUAL: 2.9 THOUSAND/UL (ref 1.85–7.62)
NEUTROPHILS # BLD MANUAL: 3.29 THOUSAND/UL (ref 1.85–7.62)
NEUTROPHILS # BLD MANUAL: 5.07 THOUSAND/UL (ref 1.85–7.62)
NEUTROPHILS # BLD MANUAL: 7.24 THOUSAND/UL (ref 1.85–7.62)
NEUTROPHILS # BLD MANUAL: 8.76 THOUSAND/UL (ref 1.85–7.62)
NEUTS BAND NFR BLD MANUAL: 10 % (ref 0–8)
NEUTS BAND NFR BLD MANUAL: 13 % (ref 0–8)
NEUTS BAND NFR BLD MANUAL: 2 % (ref 0–8)
NEUTS BAND NFR BLD MANUAL: 3 % (ref 0–8)
NEUTS BAND NFR BLD MANUAL: 7 % (ref 0–8)
NEUTS HYPERSEG BLD QL SMEAR: PRESENT
NEUTS SEG NFR BLD AUTO: 53 % (ref 43–75)
NEUTS SEG NFR BLD AUTO: 60 % (ref 43–75)
NEUTS SEG NFR BLD AUTO: 65 % (ref 43–75)
NEUTS SEG NFR BLD AUTO: 71 % (ref 43–75)
NEUTS SEG NFR BLD AUTO: 71 % (ref 43–75)
NEUTS SEG NFR BLD AUTO: 72 % (ref 43–75)
NEUTS SEG NFR BLD AUTO: 73 % (ref 43–75)
NEUTS SEG NFR BLD AUTO: 77 % (ref 43–75)
NEUTS SEG NFR BLD AUTO: 79 % (ref 43–75)
NEUTS SEG NFR BLD AUTO: 84 % (ref 43–75)
NEUTS SEG NFR BLD AUTO: 86 % (ref 43–75)
NEUTS SEG NFR BLD AUTO: 87 % (ref 43–75)
NEUTS SEG NFR BLD AUTO: 89 % (ref 43–75)
NEUTS SEG NFR BLD AUTO: 90 % (ref 43–75)
NEUTS SEG NFR BLD AUTO: 91 % (ref 43–75)
NEUTS SEG NFR BLD AUTO: 92 % (ref 43–75)
NEUTS SEG NFR BLD AUTO: 93 % (ref 43–75)
NEUTS SEG NFR BLD AUTO: 93 % (ref 43–75)
NRBC BLD AUTO-RTO: 0 /100 WBCS
NRBC BLD AUTO-RTO: 2 /100 WBC (ref 0–2)
NRBC BLD AUTO-RTO: 2 /100 WBC (ref 0–2)
NRBC BLD AUTO-RTO: 2 /100 WBCS
NRBC BLD AUTO-RTO: 3 /100 WBC (ref 0–2)
NRBC BLD AUTO-RTO: 4 /100 WBC (ref 0–2)
NT-PROBNP SERPL-MCNC: 1131 PG/ML
NT-PROBNP SERPL-MCNC: 2725 PG/ML
P AXIS: 52 DEGREES
P AXIS: 61 DEGREES
PCO2 BLD: 19 MMOL/L (ref 21–32)
PCO2 BLD: 20 MMOL/L (ref 21–32)
PCO2 BLD: 22 MMOL/L (ref 21–32)
PCO2 BLD: 22 MMOL/L (ref 21–32)
PCO2 BLD: 23.1 MM HG (ref 36–44)
PCO2 BLD: 25.1 MM HG (ref 36–44)
PCO2 BLD: 29.6 MM HG (ref 36–44)
PH BLD: 7.43 [PH] (ref 7.35–7.45)
PH BLD: 7.53 [PH] (ref 7.35–7.45)
PH BLD: 7.57 [PH] (ref 7.35–7.45)
PLATELET # BLD AUTO: 152 THOUSANDS/UL (ref 149–390)
PLATELET # BLD AUTO: 158 THOUSANDS/UL (ref 149–390)
PLATELET # BLD AUTO: 175 THOUSANDS/UL (ref 149–390)
PLATELET # BLD AUTO: 176 THOUSANDS/UL (ref 149–390)
PLATELET # BLD AUTO: 186 THOUSANDS/UL (ref 149–390)
PLATELET # BLD AUTO: 192 THOUSANDS/UL (ref 149–390)
PLATELET # BLD AUTO: 197 THOUSANDS/UL (ref 149–390)
PLATELET # BLD AUTO: 245 THOUSANDS/UL (ref 149–390)
PLATELET # BLD AUTO: 251 THOUSANDS/UL (ref 149–390)
PLATELET # BLD AUTO: 253 THOUSANDS/UL (ref 149–390)
PLATELET # BLD AUTO: 269 THOUSANDS/UL (ref 149–390)
PLATELET # BLD AUTO: 270 THOUSANDS/UL (ref 149–390)
PLATELET # BLD AUTO: 271 THOUSANDS/UL (ref 149–390)
PLATELET # BLD AUTO: 291 THOUSANDS/UL (ref 149–390)
PLATELET # BLD AUTO: 304 THOUSANDS/UL (ref 149–390)
PLATELET # BLD AUTO: 330 THOUSANDS/UL (ref 149–390)
PLATELET # BLD AUTO: 349 THOUSANDS/UL (ref 149–390)
PLATELET # BLD AUTO: 357 THOUSANDS/UL (ref 149–390)
PLATELET # BLD AUTO: 395 THOUSANDS/UL (ref 149–390)
PLATELET # BLD AUTO: 41 THOUSANDS/UL (ref 149–390)
PLATELET # BLD AUTO: 57 THOUSANDS/UL (ref 149–390)
PLATELET BLD QL SMEAR: ABNORMAL
PLATELET BLD QL SMEAR: ABNORMAL
PLATELET BLD QL SMEAR: ADEQUATE
PMV BLD AUTO: 10 FL (ref 8.9–12.7)
PMV BLD AUTO: 10.3 FL (ref 8.9–12.7)
PMV BLD AUTO: 10.4 FL (ref 8.9–12.7)
PMV BLD AUTO: 10.5 FL (ref 8.9–12.7)
PMV BLD AUTO: 10.6 FL (ref 8.9–12.7)
PMV BLD AUTO: 10.7 FL (ref 8.9–12.7)
PMV BLD AUTO: 10.9 FL (ref 8.9–12.7)
PMV BLD AUTO: 11 FL (ref 8.9–12.7)
PMV BLD AUTO: 9.1 FL (ref 8.9–12.7)
PMV BLD AUTO: 9.2 FL (ref 8.9–12.7)
PMV BLD AUTO: 9.5 FL (ref 8.9–12.7)
PMV BLD AUTO: 9.8 FL (ref 8.9–12.7)
PMV BLD AUTO: 9.8 FL (ref 8.9–12.7)
PMV BLD AUTO: 9.9 FL (ref 8.9–12.7)
PO2 BLD: 44 MM HG (ref 75–129)
PO2 BLD: 52 MM HG (ref 75–129)
PO2 BLD: 75 MM HG (ref 75–129)
POIKILOCYTOSIS BLD QL SMEAR: PRESENT
POIKILOCYTOSIS BLD QL SMEAR: PRESENT
POLYCHROMASIA BLD QL SMEAR: PRESENT
POTASSIUM BLD-SCNC: 3.9 MMOL/L (ref 3.5–5.3)
POTASSIUM BLD-SCNC: 4.1 MMOL/L (ref 3.5–5.3)
POTASSIUM BLD-SCNC: 4.2 MMOL/L (ref 3.5–5.3)
POTASSIUM BLD-SCNC: 4.2 MMOL/L (ref 3.5–5.3)
POTASSIUM SERPL-SCNC: 3 MMOL/L (ref 3.5–5.3)
POTASSIUM SERPL-SCNC: 3.4 MMOL/L (ref 3.5–5.3)
POTASSIUM SERPL-SCNC: 3.8 MMOL/L (ref 3.5–5.3)
POTASSIUM SERPL-SCNC: 3.9 MMOL/L (ref 3.5–5.3)
POTASSIUM SERPL-SCNC: 4.1 MMOL/L (ref 3.5–5.3)
POTASSIUM SERPL-SCNC: 4.2 MMOL/L (ref 3.5–5.3)
POTASSIUM SERPL-SCNC: 4.3 MMOL/L (ref 3.5–5.3)
POTASSIUM SERPL-SCNC: 4.4 MMOL/L (ref 3.5–5.3)
POTASSIUM SERPL-SCNC: 4.4 MMOL/L (ref 3.5–5.3)
POTASSIUM SERPL-SCNC: 4.5 MMOL/L (ref 3.5–5.3)
POTASSIUM SERPL-SCNC: 4.6 MMOL/L (ref 3.5–5.3)
POTASSIUM SERPL-SCNC: 4.7 MMOL/L (ref 3.5–5.3)
POTASSIUM SERPL-SCNC: 4.8 MMOL/L (ref 3.5–5.3)
POTASSIUM SERPL-SCNC: 4.8 MMOL/L (ref 3.5–5.3)
PR INTERVAL: 134 MS
PR INTERVAL: 142 MS
PROMYELOCYTES NFR BLD MANUAL: 1 % (ref 0–0)
PROT SERPL-MCNC: 5.8 G/DL (ref 6.4–8.2)
PROT SERPL-MCNC: 6 G/DL (ref 6.4–8.2)
PROT SERPL-MCNC: 6.2 G/DL (ref 6.4–8.2)
PROT SERPL-MCNC: 6.4 G/DL (ref 6.4–8.2)
PROT SERPL-MCNC: 6.7 G/DL (ref 6.4–8.2)
PROT SERPL-MCNC: 6.9 G/DL (ref 6.4–8.2)
PROT SERPL-MCNC: 7 G/DL (ref 6.4–8.2)
PROT SERPL-MCNC: 7.1 G/DL (ref 6.4–8.2)
PROT SERPL-MCNC: 7.1 G/DL (ref 6.4–8.2)
PROT SERPL-MCNC: 7.3 G/DL (ref 6.4–8.2)
PROT SERPL-MCNC: 7.3 G/DL (ref 6.4–8.2)
PROT SERPL-MCNC: 7.4 G/DL (ref 6.4–8.2)
PROT SERPL-MCNC: 7.6 G/DL (ref 6.4–8.2)
PROT SERPL-MCNC: 7.7 G/DL (ref 6.4–8.2)
PROTHROMBIN TIME: 17.7 SECONDS (ref 12.1–14.4)
PROTHROMBIN TIME: 22 SECONDS (ref 12.1–14.4)
QRS AXIS: 39 DEGREES
QRS AXIS: 57 DEGREES
QRSD INTERVAL: 84 MS
QRSD INTERVAL: 86 MS
QT INTERVAL: 274 MS
QT INTERVAL: 292 MS
QTC INTERVAL: 419 MS
QTC INTERVAL: 426 MS
RBC # BLD AUTO: 2.51 MILLION/UL (ref 3.88–5.62)
RBC # BLD AUTO: 2.55 MILLION/UL (ref 3.88–5.62)
RBC # BLD AUTO: 2.86 MILLION/UL (ref 3.88–5.62)
RBC # BLD AUTO: 2.89 MILLION/UL (ref 3.88–5.62)
RBC # BLD AUTO: 3.01 MILLION/UL (ref 3.88–5.62)
RBC # BLD AUTO: 3.68 MILLION/UL (ref 3.88–5.62)
RBC # BLD AUTO: 3.71 MILLION/UL (ref 3.88–5.62)
RBC # BLD AUTO: 3.89 MILLION/UL (ref 3.88–5.62)
RBC # BLD AUTO: 4.42 MILLION/UL (ref 3.88–5.62)
RBC # BLD AUTO: 4.48 MILLION/UL (ref 3.88–5.62)
RBC # BLD AUTO: 4.48 MILLION/UL (ref 3.88–5.62)
RBC # BLD AUTO: 4.53 MILLION/UL (ref 3.88–5.62)
RBC # BLD AUTO: 4.6 MILLION/UL (ref 3.88–5.62)
RBC # BLD AUTO: 4.68 MILLION/UL (ref 3.88–5.62)
RBC # BLD AUTO: 4.7 MILLION/UL (ref 3.88–5.62)
RBC # BLD AUTO: 4.71 MILLION/UL (ref 3.88–5.62)
RBC # BLD AUTO: 4.74 MILLION/UL (ref 3.88–5.62)
RBC # BLD AUTO: 4.75 MILLION/UL (ref 3.88–5.62)
RBC # BLD AUTO: 4.82 MILLION/UL (ref 3.88–5.62)
RBC # BLD AUTO: 4.86 MILLION/UL (ref 3.88–5.62)
RBC MORPH BLD: PRESENT
RBC MORPH BLD: PRESENT
RH BLD: NEGATIVE
S PNEUM AG UR QL: NEGATIVE
SAMPLE SITE: ABNORMAL
SAO2 % BLD FROM PO2: 86 % (ref 95–98)
SAO2 % BLD FROM PO2: 92 % (ref 95–98)
SAO2 % BLD FROM PO2: 96 % (ref 95–98)
SMUDGE CELLS BLD QL SMEAR: PRESENT
SODIUM BLD-SCNC: 128 MMOL/L (ref 136–145)
SODIUM BLD-SCNC: 128 MMOL/L (ref 136–145)
SODIUM BLD-SCNC: 131 MMOL/L (ref 136–145)
SODIUM BLD-SCNC: 136 MMOL/L (ref 136–145)
SODIUM SERPL-SCNC: 129 MMOL/L (ref 136–145)
SODIUM SERPL-SCNC: 131 MMOL/L (ref 136–145)
SODIUM SERPL-SCNC: 133 MMOL/L (ref 136–145)
SODIUM SERPL-SCNC: 134 MMOL/L (ref 136–145)
SODIUM SERPL-SCNC: 134 MMOL/L (ref 136–145)
SODIUM SERPL-SCNC: 135 MMOL/L (ref 136–145)
SODIUM SERPL-SCNC: 136 MMOL/L (ref 136–145)
SODIUM SERPL-SCNC: 137 MMOL/L (ref 136–145)
SODIUM SERPL-SCNC: 137 MMOL/L (ref 136–145)
SODIUM SERPL-SCNC: 138 MMOL/L (ref 136–145)
SODIUM SERPL-SCNC: 138 MMOL/L (ref 136–145)
SODIUM SERPL-SCNC: 140 MMOL/L (ref 136–145)
SODIUM SERPL-SCNC: 143 MMOL/L (ref 136–145)
SPECIMEN EXPIRATION DATE: NORMAL
SPECIMEN SOURCE: ABNORMAL
T WAVE AXIS: 40 DEGREES
T WAVE AXIS: 41 DEGREES
TOTAL CELLS COUNTED SPEC: 100
TOXIC GRANULES BLD QL SMEAR: PRESENT
TOXIC GRANULES BLD QL SMEAR: PRESENT
TROPONIN I BLD-MCNC: 1.85 NG/ML (ref 0–0.08)
TROPONIN I SERPL-MCNC: 2.29 NG/ML
TROPONIN I SERPL-MCNC: 2.57 NG/ML
TROPONIN I SERPL-MCNC: 2.62 NG/ML
UNIT DISPENSE STATUS: NORMAL
UNIT PRODUCT CODE: NORMAL
UNIT RH: NORMAL
VANCOMYCIN TROUGH SERPL-MCNC: 10 UG/ML (ref 10–20)
VANCOMYCIN TROUGH SERPL-MCNC: 12.5 UG/ML (ref 10–20)
VARIANT LYMPHS # BLD AUTO: 1 %
VENTRICULAR RATE: 128 BPM
VENTRICULAR RATE: 141 BPM
WBC # BLD AUTO: 10.75 THOUSAND/UL (ref 4.31–10.16)
WBC # BLD AUTO: 11.52 THOUSAND/UL (ref 4.31–10.16)
WBC # BLD AUTO: 17.97 THOUSAND/UL (ref 4.31–10.16)
WBC # BLD AUTO: 18.62 THOUSAND/UL (ref 4.31–10.16)
WBC # BLD AUTO: 3.08 THOUSAND/UL (ref 4.31–10.16)
WBC # BLD AUTO: 3.36 THOUSAND/UL (ref 4.31–10.16)
WBC # BLD AUTO: 4.44 THOUSAND/UL (ref 4.31–10.16)
WBC # BLD AUTO: 4.94 THOUSAND/UL (ref 4.31–10.16)
WBC # BLD AUTO: 5.08 THOUSAND/UL (ref 4.31–10.16)
WBC # BLD AUTO: 5.28 THOUSAND/UL (ref 4.31–10.16)
WBC # BLD AUTO: 5.86 THOUSAND/UL (ref 4.31–10.16)
WBC # BLD AUTO: 6.08 THOUSAND/UL (ref 4.31–10.16)
WBC # BLD AUTO: 6.55 THOUSAND/UL (ref 4.31–10.16)
WBC # BLD AUTO: 6.94 THOUSAND/UL (ref 4.31–10.16)
WBC # BLD AUTO: 7.15 THOUSAND/UL (ref 4.31–10.16)
WBC # BLD AUTO: 7.27 THOUSAND/UL (ref 4.31–10.16)
WBC # BLD AUTO: 7.62 THOUSAND/UL (ref 4.31–10.16)
WBC # BLD AUTO: 7.94 THOUSAND/UL (ref 4.31–10.16)
WBC # BLD AUTO: 8.77 THOUSAND/UL (ref 4.31–10.16)
WBC # BLD AUTO: 9.73 THOUSAND/UL (ref 4.31–10.16)
WBC # BLD EST: NEGATIVE 10*3/UL
WBC TOXIC VACUOLES BLD QL SMEAR: PRESENT

## 2017-01-01 PROCEDURE — 84295 ASSAY OF SERUM SODIUM: CPT

## 2017-01-01 PROCEDURE — 71020 HB CHEST X-RAY 2VW FRONTAL&LATL: CPT

## 2017-01-01 PROCEDURE — G0498 CHEMO EXTEND IV INFUS W/PUMP: HCPCS

## 2017-01-01 PROCEDURE — 85730 THROMBOPLASTIN TIME PARTIAL: CPT | Performed by: EMERGENCY MEDICINE

## 2017-01-01 PROCEDURE — 96375 TX/PRO/DX INJ NEW DRUG ADDON: CPT

## 2017-01-01 PROCEDURE — 93306 TTE W/DOPPLER COMPLETE: CPT

## 2017-01-01 PROCEDURE — 77387 GUIDANCE FOR RADJ TX DLVR: CPT | Performed by: RADIOLOGY

## 2017-01-01 PROCEDURE — 77290 THER RAD SIMULAJ FIELD CPLX: CPT | Performed by: RADIOLOGY

## 2017-01-01 PROCEDURE — 82947 ASSAY GLUCOSE BLOOD QUANT: CPT

## 2017-01-01 PROCEDURE — 80048 BASIC METABOLIC PNL TOTAL CA: CPT | Performed by: PHYSICIAN ASSISTANT

## 2017-01-01 PROCEDURE — 77412 RADIATION TX DELIVERY LVL 3: CPT | Performed by: RADIOLOGY

## 2017-01-01 PROCEDURE — 85014 HEMATOCRIT: CPT

## 2017-01-01 PROCEDURE — 85610 PROTHROMBIN TIME: CPT | Performed by: EMERGENCY MEDICINE

## 2017-01-01 PROCEDURE — 80048 BASIC METABOLIC PNL TOTAL CA: CPT | Performed by: NURSE PRACTITIONER

## 2017-01-01 PROCEDURE — 96366 THER/PROPH/DIAG IV INF ADDON: CPT

## 2017-01-01 PROCEDURE — 99285 EMERGENCY DEPT VISIT HI MDM: CPT

## 2017-01-01 PROCEDURE — 77412 RADIATION TX DELIVERY LVL 3: CPT

## 2017-01-01 PROCEDURE — 71275 CT ANGIOGRAPHY CHEST: CPT

## 2017-01-01 PROCEDURE — 96413 CHEMO IV INFUSION 1 HR: CPT

## 2017-01-01 PROCEDURE — 71010 HB CHEST X-RAY 1 VIEW FRONTAL: CPT

## 2017-01-01 PROCEDURE — 80053 COMPREHEN METABOLIC PANEL: CPT

## 2017-01-01 PROCEDURE — 99213 OFFICE O/P EST LOW 20 MIN: CPT | Performed by: RADIOLOGY

## 2017-01-01 PROCEDURE — 96411 CHEMO IV PUSH ADDL DRUG: CPT

## 2017-01-01 PROCEDURE — 96374 THER/PROPH/DIAG INJ IV PUSH: CPT

## 2017-01-01 PROCEDURE — 96367 TX/PROPH/DG ADDL SEQ IV INF: CPT

## 2017-01-01 PROCEDURE — 85025 COMPLETE CBC W/AUTO DIFF WBC: CPT

## 2017-01-01 PROCEDURE — 83605 ASSAY OF LACTIC ACID: CPT | Performed by: PHYSICIAN ASSISTANT

## 2017-01-01 PROCEDURE — 96523 IRRIG DRUG DELIVERY DEVICE: CPT

## 2017-01-01 PROCEDURE — 85027 COMPLETE CBC AUTOMATED: CPT | Performed by: NURSE PRACTITIONER

## 2017-01-01 PROCEDURE — 85730 THROMBOPLASTIN TIME PARTIAL: CPT | Performed by: PHYSICIAN ASSISTANT

## 2017-01-01 PROCEDURE — 36415 COLL VENOUS BLD VENIPUNCTURE: CPT

## 2017-01-01 PROCEDURE — 94660 CPAP INITIATION&MGMT: CPT

## 2017-01-01 PROCEDURE — 82330 ASSAY OF CALCIUM: CPT

## 2017-01-01 PROCEDURE — 73223 MRI JOINT UPR EXTR W/O&W/DYE: CPT

## 2017-01-01 PROCEDURE — 96401 CHEMO ANTI-NEOPL SQ/IM: CPT

## 2017-01-01 PROCEDURE — 99211 OFF/OP EST MAY X REQ PHY/QHP: CPT

## 2017-01-01 PROCEDURE — 82803 BLOOD GASES ANY COMBINATION: CPT

## 2017-01-01 PROCEDURE — 96416 CHEMO PROLONG INFUSE W/PUMP: CPT

## 2017-01-01 PROCEDURE — 86850 RBC ANTIBODY SCREEN: CPT | Performed by: PHYSICIAN ASSISTANT

## 2017-01-01 PROCEDURE — 77336 RADIATION PHYSICS CONSULT: CPT | Performed by: RADIOLOGY

## 2017-01-01 PROCEDURE — 85027 COMPLETE CBC AUTOMATED: CPT | Performed by: PHYSICIAN ASSISTANT

## 2017-01-01 PROCEDURE — 86901 BLOOD TYPING SEROLOGIC RH(D): CPT | Performed by: PHYSICIAN ASSISTANT

## 2017-01-01 PROCEDURE — 71260 CT THORAX DX C+: CPT

## 2017-01-01 PROCEDURE — 85007 BL SMEAR W/DIFF WBC COUNT: CPT

## 2017-01-01 PROCEDURE — 80047 BASIC METABLC PNL IONIZED CA: CPT

## 2017-01-01 PROCEDURE — 85007 BL SMEAR W/DIFF WBC COUNT: CPT | Performed by: NURSE PRACTITIONER

## 2017-01-01 PROCEDURE — 96365 THER/PROPH/DIAG IV INF INIT: CPT

## 2017-01-01 PROCEDURE — 72146 MRI CHEST SPINE W/O DYE: CPT

## 2017-01-01 PROCEDURE — 84132 ASSAY OF SERUM POTASSIUM: CPT

## 2017-01-01 PROCEDURE — 85018 HEMOGLOBIN: CPT | Performed by: PHYSICIAN ASSISTANT

## 2017-01-01 PROCEDURE — 87081 CULTURE SCREEN ONLY: CPT | Performed by: NURSE PRACTITIONER

## 2017-01-01 PROCEDURE — 94640 AIRWAY INHALATION TREATMENT: CPT

## 2017-01-01 PROCEDURE — 87040 BLOOD CULTURE FOR BACTERIA: CPT | Performed by: EMERGENCY MEDICINE

## 2017-01-01 PROCEDURE — 94760 N-INVAS EAR/PLS OXIMETRY 1: CPT

## 2017-01-01 PROCEDURE — 87449 NOS EACH ORGANISM AG IA: CPT | Performed by: PHYSICIAN ASSISTANT

## 2017-01-01 PROCEDURE — 36600 WITHDRAWAL OF ARTERIAL BLOOD: CPT

## 2017-01-01 PROCEDURE — 86900 BLOOD TYPING SEROLOGIC ABO: CPT | Performed by: PHYSICIAN ASSISTANT

## 2017-01-01 PROCEDURE — 93005 ELECTROCARDIOGRAM TRACING: CPT | Performed by: PHYSICIAN ASSISTANT

## 2017-01-01 PROCEDURE — 96361 HYDRATE IV INFUSION ADD-ON: CPT

## 2017-01-01 PROCEDURE — 83605 ASSAY OF LACTIC ACID: CPT | Performed by: EMERGENCY MEDICINE

## 2017-01-01 PROCEDURE — 77280 THER RAD SIMULAJ FIELD SMPL: CPT | Performed by: RADIOLOGY

## 2017-01-01 PROCEDURE — 80053 COMPREHEN METABOLIC PANEL: CPT | Performed by: PHYSICIAN ASSISTANT

## 2017-01-01 PROCEDURE — 80202 ASSAY OF VANCOMYCIN: CPT | Performed by: INTERNAL MEDICINE

## 2017-01-01 PROCEDURE — 36415 COLL VENOUS BLD VENIPUNCTURE: CPT | Performed by: EMERGENCY MEDICINE

## 2017-01-01 PROCEDURE — 72148 MRI LUMBAR SPINE W/O DYE: CPT

## 2017-01-01 PROCEDURE — 70450 CT HEAD/BRAIN W/O DYE: CPT

## 2017-01-01 PROCEDURE — 77331 SPECIAL RADIATION DOSIMETRY: CPT | Performed by: RADIOLOGY

## 2017-01-01 PROCEDURE — 77334 RADIATION TREATMENT AID(S): CPT | Performed by: RADIOLOGY

## 2017-01-01 PROCEDURE — 85007 BL SMEAR W/DIFF WBC COUNT: CPT | Performed by: PHYSICIAN ASSISTANT

## 2017-01-01 PROCEDURE — 36415 COLL VENOUS BLD VENIPUNCTURE: CPT | Performed by: PHYSICIAN ASSISTANT

## 2017-01-01 PROCEDURE — 85610 PROTHROMBIN TIME: CPT | Performed by: PHYSICIAN ASSISTANT

## 2017-01-01 PROCEDURE — 84484 ASSAY OF TROPONIN QUANT: CPT | Performed by: PHYSICIAN ASSISTANT

## 2017-01-01 PROCEDURE — 83880 ASSAY OF NATRIURETIC PEPTIDE: CPT | Performed by: PHYSICIAN ASSISTANT

## 2017-01-01 PROCEDURE — 93005 ELECTROCARDIOGRAM TRACING: CPT | Performed by: EMERGENCY MEDICINE

## 2017-01-01 PROCEDURE — 74177 CT ABD & PELVIS W/CONTRAST: CPT

## 2017-01-01 PROCEDURE — 94669 MECHANICAL CHEST WALL OSCILL: CPT

## 2017-01-01 PROCEDURE — 99214 OFFICE O/P EST MOD 30 MIN: CPT | Performed by: RADIOLOGY

## 2017-01-01 PROCEDURE — 96360 HYDRATION IV INFUSION INIT: CPT

## 2017-01-01 PROCEDURE — 77387 GUIDANCE FOR RADJ TX DLVR: CPT

## 2017-01-01 PROCEDURE — 94668 MNPJ CHEST WALL SBSQ: CPT

## 2017-01-01 PROCEDURE — 85049 AUTOMATED PLATELET COUNT: CPT | Performed by: PHYSICIAN ASSISTANT

## 2017-01-01 PROCEDURE — 85027 COMPLETE CBC AUTOMATED: CPT | Performed by: EMERGENCY MEDICINE

## 2017-01-01 PROCEDURE — 85027 COMPLETE CBC AUTOMATED: CPT

## 2017-01-01 PROCEDURE — 71250 CT THORAX DX C-: CPT

## 2017-01-01 PROCEDURE — P9040 RBC LEUKOREDUCED IRRADIATED: HCPCS

## 2017-01-01 PROCEDURE — 85007 BL SMEAR W/DIFF WBC COUNT: CPT | Performed by: EMERGENCY MEDICINE

## 2017-01-01 PROCEDURE — 84484 ASSAY OF TROPONIN QUANT: CPT

## 2017-01-01 PROCEDURE — 80053 COMPREHEN METABOLIC PANEL: CPT | Performed by: EMERGENCY MEDICINE

## 2017-01-01 PROCEDURE — 83880 ASSAY OF NATRIURETIC PEPTIDE: CPT | Performed by: EMERGENCY MEDICINE

## 2017-01-01 PROCEDURE — 77417 THER RADIOLOGY PORT IMAGE(S): CPT | Performed by: RADIOLOGY

## 2017-01-01 PROCEDURE — 85018 HEMOGLOBIN: CPT | Performed by: INTERNAL MEDICINE

## 2017-01-01 PROCEDURE — 81002 URINALYSIS NONAUTO W/O SCOPE: CPT | Performed by: PHYSICIAN ASSISTANT

## 2017-01-01 PROCEDURE — 77295 3-D RADIOTHERAPY PLAN: CPT | Performed by: RADIOLOGY

## 2017-01-01 PROCEDURE — 94664 DEMO&/EVAL PT USE INHALER: CPT

## 2017-01-01 PROCEDURE — 77370 RADIATION PHYSICS CONSULT: CPT | Performed by: RADIOLOGY

## 2017-01-01 PROCEDURE — 83735 ASSAY OF MAGNESIUM: CPT | Performed by: PHYSICIAN ASSISTANT

## 2017-01-01 PROCEDURE — 87040 BLOOD CULTURE FOR BACTERIA: CPT | Performed by: PHYSICIAN ASSISTANT

## 2017-01-01 PROCEDURE — 80202 ASSAY OF VANCOMYCIN: CPT | Performed by: NURSE PRACTITIONER

## 2017-01-01 PROCEDURE — 5A09357 ASSISTANCE WITH RESPIRATORY VENTILATION, LESS THAN 24 CONSECUTIVE HOURS, CONTINUOUS POSITIVE AIRWAY PRESSURE: ICD-10-PCS | Performed by: INTERNAL MEDICINE

## 2017-01-01 PROCEDURE — 86920 COMPATIBILITY TEST SPIN: CPT

## 2017-01-01 PROCEDURE — 83735 ASSAY OF MAGNESIUM: CPT | Performed by: NURSE PRACTITIONER

## 2017-01-01 PROCEDURE — A9585 GADOBUTROL INJECTION: HCPCS | Performed by: RADIOLOGY

## 2017-01-01 PROCEDURE — 77300 RADIATION THERAPY DOSE PLAN: CPT | Performed by: RADIOLOGY

## 2017-01-01 PROCEDURE — 30233N1 TRANSFUSION OF NONAUTOLOGOUS RED BLOOD CELLS INTO PERIPHERAL VEIN, PERCUTANEOUS APPROACH: ICD-10-PCS | Performed by: INTERNAL MEDICINE

## 2017-01-01 RX ORDER — SENNOSIDES 8.6 MG
2 TABLET ORAL DAILY PRN
Status: DISCONTINUED | OUTPATIENT
Start: 2017-01-01 | End: 2017-01-01 | Stop reason: HOSPADM

## 2017-01-01 RX ORDER — LORAZEPAM 2 MG/ML
1 INJECTION INTRAMUSCULAR EVERY 4 HOURS PRN
Status: DISCONTINUED | OUTPATIENT
Start: 2017-01-01 | End: 2017-01-01 | Stop reason: HOSPADM

## 2017-01-01 RX ORDER — MORPHINE SULFATE 15 MG/1
15 TABLET ORAL EVERY EVENING
Status: DISCONTINUED | OUTPATIENT
Start: 2017-01-01 | End: 2017-01-01 | Stop reason: HOSPADM

## 2017-01-01 RX ORDER — SODIUM CHLORIDE 9 MG/ML
20 INJECTION, SOLUTION INTRAVENOUS CONTINUOUS
Status: DISCONTINUED | OUTPATIENT
Start: 2017-01-01 | End: 2017-01-01 | Stop reason: HOSPADM

## 2017-01-01 RX ORDER — OXYCODONE HYDROCHLORIDE 10 MG/1
10 TABLET ORAL EVERY 4 HOURS PRN
Status: DISCONTINUED | OUTPATIENT
Start: 2017-01-01 | End: 2017-01-01 | Stop reason: HOSPADM

## 2017-01-01 RX ORDER — LORAZEPAM 2 MG/ML
INJECTION INTRAMUSCULAR
Status: COMPLETED
Start: 2017-01-01 | End: 2017-01-01

## 2017-01-01 RX ORDER — FLUOROURACIL 50 MG/ML
836 INJECTION, SOLUTION INTRAVENOUS ONCE
Status: COMPLETED | OUTPATIENT
Start: 2017-01-01 | End: 2017-01-01

## 2017-01-01 RX ORDER — MORPHINE SULFATE 30 MG/1
30 TABLET, FILM COATED, EXTENDED RELEASE ORAL DAILY
Status: DISCONTINUED | OUTPATIENT
Start: 2017-01-01 | End: 2017-01-01 | Stop reason: HOSPADM

## 2017-01-01 RX ORDER — LEVALBUTEROL 1.25 MG/.5ML
1.25 SOLUTION, CONCENTRATE RESPIRATORY (INHALATION) 3 TIMES DAILY
Status: DISCONTINUED | OUTPATIENT
Start: 2017-01-01 | End: 2017-01-01

## 2017-01-01 RX ORDER — LORAZEPAM 2 MG/ML
0.5 INJECTION INTRAMUSCULAR ONCE
Status: COMPLETED | OUTPATIENT
Start: 2017-01-01 | End: 2017-01-01

## 2017-01-01 RX ORDER — HYDROMORPHONE HCL 110MG/55ML
2 PATIENT CONTROLLED ANALGESIA SYRINGE INTRAVENOUS ONCE
Status: COMPLETED | OUTPATIENT
Start: 2017-01-01 | End: 2017-01-01

## 2017-01-01 RX ORDER — GLYCOPYRROLATE 0.2 MG/ML
0.1 INJECTION INTRAMUSCULAR; INTRAVENOUS ONCE
Status: COMPLETED | OUTPATIENT
Start: 2017-01-01 | End: 2017-01-01

## 2017-01-01 RX ORDER — OXYCODONE HYDROCHLORIDE 10 MG/1
10 TABLET ORAL EVERY 4 HOURS PRN
Status: DISCONTINUED | OUTPATIENT
Start: 2017-01-01 | End: 2017-01-01

## 2017-01-01 RX ORDER — MORPHINE SULFATE 4 MG/ML
4 INJECTION, SOLUTION INTRAMUSCULAR; INTRAVENOUS ONCE
Status: COMPLETED | OUTPATIENT
Start: 2017-01-01 | End: 2017-01-01

## 2017-01-01 RX ORDER — LEVALBUTEROL 1.25 MG/.5ML
SOLUTION, CONCENTRATE RESPIRATORY (INHALATION)
Status: COMPLETED
Start: 2017-01-01 | End: 2017-01-01

## 2017-01-01 RX ORDER — MORPHINE SULFATE 4 MG/ML
INJECTION, SOLUTION INTRAMUSCULAR; INTRAVENOUS
Status: COMPLETED
Start: 2017-01-01 | End: 2017-01-01

## 2017-01-01 RX ORDER — SODIUM CHLORIDE FOR INHALATION 0.9 %
3 VIAL, NEBULIZER (ML) INHALATION
Status: DISCONTINUED | OUTPATIENT
Start: 2017-01-01 | End: 2017-01-01

## 2017-01-01 RX ORDER — HYDROMORPHONE HCL 110MG/55ML
PATIENT CONTROLLED ANALGESIA SYRINGE INTRAVENOUS
Status: DISPENSED
Start: 2017-01-01 | End: 2017-01-01

## 2017-01-01 RX ORDER — POTASSIUM CHLORIDE 20 MEQ/1
40 TABLET, EXTENDED RELEASE ORAL ONCE
Status: COMPLETED | OUTPATIENT
Start: 2017-01-01 | End: 2017-01-01

## 2017-01-01 RX ORDER — MORPHINE SULFATE 4 MG/ML
4 INJECTION, SOLUTION INTRAMUSCULAR; INTRAVENOUS
Status: DISCONTINUED | OUTPATIENT
Start: 2017-01-01 | End: 2017-01-01

## 2017-01-01 RX ORDER — LORAZEPAM 0.5 MG/1
0.5 TABLET ORAL
Status: DISCONTINUED | OUTPATIENT
Start: 2017-01-01 | End: 2017-01-01 | Stop reason: HOSPADM

## 2017-01-01 RX ORDER — SENNOSIDES 8.6 MG
2 TABLET ORAL DAILY PRN
Qty: 120 EACH | Refills: 0 | Status: SHIPPED | OUTPATIENT
Start: 2017-01-01 | End: 2017-01-01 | Stop reason: HOSPADM

## 2017-01-01 RX ORDER — LORAZEPAM 2 MG/ML
2 INJECTION INTRAMUSCULAR ONCE
Status: COMPLETED | OUTPATIENT
Start: 2017-01-01 | End: 2017-01-01

## 2017-01-01 RX ORDER — ONDANSETRON 2 MG/ML
4 INJECTION INTRAMUSCULAR; INTRAVENOUS ONCE
Status: COMPLETED | OUTPATIENT
Start: 2017-01-01 | End: 2017-01-01

## 2017-01-01 RX ORDER — FUROSEMIDE 10 MG/ML
40 INJECTION INTRAMUSCULAR; INTRAVENOUS ONCE
Status: COMPLETED | OUTPATIENT
Start: 2017-01-01 | End: 2017-01-01

## 2017-01-01 RX ORDER — FLUOROURACIL 50 MG/ML
812 INJECTION, SOLUTION INTRAVENOUS ONCE
Status: COMPLETED | OUTPATIENT
Start: 2017-01-01 | End: 2017-01-01

## 2017-01-01 RX ORDER — DOCUSATE SODIUM 100 MG/1
100 CAPSULE, LIQUID FILLED ORAL 2 TIMES DAILY
Status: DISCONTINUED | OUTPATIENT
Start: 2017-01-01 | End: 2017-01-01 | Stop reason: HOSPADM

## 2017-01-01 RX ORDER — FLUOROURACIL 50 MG/ML
792 INJECTION, SOLUTION INTRAVENOUS ONCE
Status: COMPLETED | OUTPATIENT
Start: 2017-01-01 | End: 2017-01-01

## 2017-01-01 RX ORDER — FLUOROURACIL 50 MG/ML
832 INJECTION, SOLUTION INTRAVENOUS ONCE
Status: COMPLETED | OUTPATIENT
Start: 2017-01-01 | End: 2017-01-01

## 2017-01-01 RX ORDER — MORPHINE SULFATE 4 MG/ML
4 INJECTION, SOLUTION INTRAMUSCULAR; INTRAVENOUS EVERY 4 HOURS
Status: DISCONTINUED | OUTPATIENT
Start: 2017-01-01 | End: 2017-01-01 | Stop reason: HOSPADM

## 2017-01-01 RX ORDER — GUAIFENESIN 600 MG
600 TABLET, EXTENDED RELEASE 12 HR ORAL EVERY 12 HOURS SCHEDULED
Status: DISCONTINUED | OUTPATIENT
Start: 2017-01-01 | End: 2017-01-01 | Stop reason: HOSPADM

## 2017-01-01 RX ORDER — LEVOFLOXACIN 5 MG/ML
750 INJECTION, SOLUTION INTRAVENOUS ONCE
Status: COMPLETED | OUTPATIENT
Start: 2017-01-01 | End: 2017-01-01

## 2017-01-01 RX ORDER — FLUOROURACIL 50 MG/ML
820 INJECTION, SOLUTION INTRAVENOUS ONCE
Status: COMPLETED | OUTPATIENT
Start: 2017-01-01 | End: 2017-01-01

## 2017-01-01 RX ORDER — LORAZEPAM 2 MG/ML
0.5 INJECTION INTRAMUSCULAR EVERY 4 HOURS PRN
Status: DISCONTINUED | OUTPATIENT
Start: 2017-01-01 | End: 2017-01-01 | Stop reason: HOSPADM

## 2017-01-01 RX ORDER — ALBUTEROL SULFATE 2.5 MG/3ML
2.5 SOLUTION RESPIRATORY (INHALATION) EVERY 6 HOURS PRN
Status: DISCONTINUED | OUTPATIENT
Start: 2017-01-01 | End: 2017-01-01 | Stop reason: HOSPADM

## 2017-01-01 RX ORDER — LORAZEPAM 2 MG/ML
1 INJECTION INTRAMUSCULAR ONCE
Status: COMPLETED | OUTPATIENT
Start: 2017-01-01 | End: 2017-01-01

## 2017-01-01 RX ORDER — LEVALBUTEROL 1.25 MG/.5ML
1.25 SOLUTION, CONCENTRATE RESPIRATORY (INHALATION)
Status: DISCONTINUED | OUTPATIENT
Start: 2017-01-01 | End: 2017-01-01 | Stop reason: HOSPADM

## 2017-01-01 RX ORDER — SODIUM CHLORIDE FOR INHALATION 0.9 %
VIAL, NEBULIZER (ML) INHALATION
Status: COMPLETED
Start: 2017-01-01 | End: 2017-01-01

## 2017-01-01 RX ORDER — SODIUM CHLORIDE 9 MG/ML
100 INJECTION, SOLUTION INTRAVENOUS CONTINUOUS
Status: DISCONTINUED | OUTPATIENT
Start: 2017-01-01 | End: 2017-01-01

## 2017-01-01 RX ORDER — MORPHINE SULFATE 15 MG/1
15 TABLET ORAL EVERY EVENING
COMMUNITY
End: 2017-01-01 | Stop reason: HOSPADM

## 2017-01-01 RX ORDER — ACETAMINOPHEN 325 MG/1
650 TABLET ORAL EVERY 6 HOURS PRN
Status: DISCONTINUED | OUTPATIENT
Start: 2017-01-01 | End: 2017-01-01 | Stop reason: HOSPADM

## 2017-01-01 RX ORDER — ONDANSETRON 2 MG/ML
INJECTION INTRAMUSCULAR; INTRAVENOUS
Status: COMPLETED
Start: 2017-01-01 | End: 2017-01-01

## 2017-01-01 RX ORDER — LORAZEPAM 2 MG/ML
0.5 INJECTION INTRAMUSCULAR EVERY 8 HOURS SCHEDULED
Status: DISCONTINUED | OUTPATIENT
Start: 2017-01-01 | End: 2017-01-01

## 2017-01-01 RX ORDER — FLUOROURACIL 50 MG/ML
792 INJECTION, SOLUTION INTRAVENOUS ONCE
Status: DISCONTINUED | OUTPATIENT
Start: 2017-01-01 | End: 2017-01-01

## 2017-01-01 RX ORDER — LEVOFLOXACIN 5 MG/ML
750 INJECTION, SOLUTION INTRAVENOUS EVERY 24 HOURS
Status: DISCONTINUED | OUTPATIENT
Start: 2017-01-01 | End: 2017-01-01 | Stop reason: HOSPADM

## 2017-01-01 RX ORDER — SCOLOPAMINE TRANSDERMAL SYSTEM 1 MG/1
1 PATCH, EXTENDED RELEASE TRANSDERMAL
Status: DISCONTINUED | OUTPATIENT
Start: 2017-01-01 | End: 2017-01-01 | Stop reason: HOSPADM

## 2017-01-01 RX ORDER — HEPARIN SODIUM (PORCINE) LOCK FLUSH IV SOLN 100 UNIT/ML 100 UNIT/ML
300 SOLUTION INTRAVENOUS ONCE
Status: COMPLETED | OUTPATIENT
Start: 2017-01-01 | End: 2017-01-01

## 2017-01-01 RX ORDER — HYDROMORPHONE HCL 110MG/55ML
PATIENT CONTROLLED ANALGESIA SYRINGE INTRAVENOUS
Status: COMPLETED
Start: 2017-01-01 | End: 2017-01-01

## 2017-01-01 RX ORDER — PANTOPRAZOLE SODIUM 40 MG/1
40 TABLET, DELAYED RELEASE ORAL
Status: DISCONTINUED | OUTPATIENT
Start: 2017-01-01 | End: 2017-01-01 | Stop reason: HOSPADM

## 2017-01-01 RX ORDER — FUROSEMIDE 10 MG/ML
INJECTION INTRAMUSCULAR; INTRAVENOUS
Status: COMPLETED
Start: 2017-01-01 | End: 2017-01-01

## 2017-01-01 RX ORDER — POTASSIUM CHLORIDE 20 MEQ/1
20 TABLET, EXTENDED RELEASE ORAL ONCE
Status: COMPLETED | OUTPATIENT
Start: 2017-01-01 | End: 2017-01-01

## 2017-01-01 RX ORDER — LEVOFLOXACIN 750 MG/1
750 TABLET ORAL EVERY 24 HOURS
Qty: 7 TABLET | Refills: 0 | Status: SHIPPED | OUTPATIENT
Start: 2017-01-01 | End: 2017-01-01

## 2017-01-01 RX ORDER — MORPHINE SULFATE 2 MG/ML
2 INJECTION, SOLUTION INTRAMUSCULAR; INTRAVENOUS
Status: DISCONTINUED | OUTPATIENT
Start: 2017-01-01 | End: 2017-01-01 | Stop reason: HOSPADM

## 2017-01-01 RX ORDER — MORPHINE SULFATE 10 MG/ML
8 INJECTION, SOLUTION INTRAMUSCULAR; INTRAVENOUS ONCE
Status: COMPLETED | OUTPATIENT
Start: 2017-01-01 | End: 2017-01-01

## 2017-01-01 RX ORDER — LORAZEPAM 0.5 MG/1
0.25 TABLET ORAL
Status: DISCONTINUED | OUTPATIENT
Start: 2017-01-01 | End: 2017-01-01 | Stop reason: HOSPADM

## 2017-01-01 RX ADMIN — MORPHINE SULFATE 4 MG: 4 INJECTION, SOLUTION INTRAMUSCULAR; INTRAVENOUS at 18:05

## 2017-01-01 RX ADMIN — LORAZEPAM 1 MG: 2 INJECTION INTRAMUSCULAR at 18:06

## 2017-01-01 RX ADMIN — DEXAMETHASONE SODIUM PHOSPHATE: 10 INJECTION, SOLUTION INTRAMUSCULAR; INTRAVENOUS at 12:33

## 2017-01-01 RX ADMIN — MORPHINE SULFATE 4 MG: 4 INJECTION, SOLUTION INTRAMUSCULAR; INTRAVENOUS at 17:54

## 2017-01-01 RX ADMIN — ONDANSETRON 4 MG: 2 INJECTION INTRAMUSCULAR; INTRAVENOUS at 18:17

## 2017-01-01 RX ADMIN — GUAIFENESIN 600 MG: 600 TABLET, EXTENDED RELEASE ORAL at 10:02

## 2017-01-01 RX ADMIN — MORPHINE SULFATE 8 MG: 10 INJECTION, SOLUTION INTRAMUSCULAR; INTRAVENOUS at 18:08

## 2017-01-01 RX ADMIN — HEPARIN 300 UNITS: 100 SYRINGE at 11:05

## 2017-01-01 RX ADMIN — VANCOMYCIN HYDROCHLORIDE 1500 MG: 1 INJECTION, POWDER, LYOPHILIZED, FOR SOLUTION INTRAVENOUS at 00:40

## 2017-01-01 RX ADMIN — MORPHINE SULFATE 15 MG: 15 TABLET ORAL at 18:11

## 2017-01-01 RX ADMIN — IOHEXOL 100 ML: 350 INJECTION, SOLUTION INTRAVENOUS at 15:45

## 2017-01-01 RX ADMIN — LEUCOVORIN CALCIUM 820 MG: 200 INJECTION, POWDER, LYOPHILIZED, FOR SOLUTION INTRAMUSCULAR; INTRAVENOUS at 12:10

## 2017-01-01 RX ADMIN — LORAZEPAM 0.25 MG: 0.5 TABLET ORAL at 14:14

## 2017-01-01 RX ADMIN — PANTOPRAZOLE SODIUM 40 MG: 40 TABLET, DELAYED RELEASE ORAL at 06:06

## 2017-01-01 RX ADMIN — LEUCOVORIN CALCIUM 832 MG: 200 INJECTION, POWDER, LYOPHILIZED, FOR SOLUTION INTRAMUSCULAR; INTRAVENOUS at 10:52

## 2017-01-01 RX ADMIN — HEPARIN 300 UNITS: 100 SYRINGE at 10:25

## 2017-01-01 RX ADMIN — IOHEXOL 100 ML: 350 INJECTION, SOLUTION INTRAVENOUS at 16:29

## 2017-01-01 RX ADMIN — Medication 300 UNITS: at 12:50

## 2017-01-01 RX ADMIN — IPRATROPIUM BROMIDE 0.5 MG: 0.5 SOLUTION RESPIRATORY (INHALATION) at 02:06

## 2017-01-01 RX ADMIN — MORPHINE SULFATE 4 MG: 4 INJECTION, SOLUTION INTRAMUSCULAR; INTRAVENOUS at 01:58

## 2017-01-01 RX ADMIN — IPRATROPIUM BROMIDE 0.5 MG: 0.5 SOLUTION RESPIRATORY (INHALATION) at 07:36

## 2017-01-01 RX ADMIN — HEPARIN 300 UNITS: 100 SYRINGE at 10:15

## 2017-01-01 RX ADMIN — MORPHINE SULFATE 15 MG: 15 TABLET ORAL at 17:22

## 2017-01-01 RX ADMIN — GUAIFENESIN 600 MG: 600 TABLET, EXTENDED RELEASE ORAL at 08:27

## 2017-01-01 RX ADMIN — Medication 378 MG: at 09:17

## 2017-01-01 RX ADMIN — FUROSEMIDE 40 MG: 10 INJECTION, SOLUTION INTRAMUSCULAR; INTRAVENOUS at 18:15

## 2017-01-01 RX ADMIN — Medication 300 UNITS: at 11:19

## 2017-01-01 RX ADMIN — LEVALBUTEROL HYDROCHLORIDE 1.25 MG: 1.25 SOLUTION, CONCENTRATE RESPIRATORY (INHALATION) at 19:58

## 2017-01-01 RX ADMIN — MORPHINE SULFATE 15 MG: 15 TABLET ORAL at 17:15

## 2017-01-01 RX ADMIN — MORPHINE SULFATE 4 MG: 4 INJECTION, SOLUTION INTRAMUSCULAR; INTRAVENOUS at 09:53

## 2017-01-01 RX ADMIN — LEUCOVORIN CALCIUM 836 MG: 350 INJECTION, POWDER, LYOPHILIZED, FOR SOLUTION INTRAMUSCULAR; INTRAVENOUS at 10:45

## 2017-01-01 RX ADMIN — POTASSIUM CHLORIDE 40 MEQ: 1500 TABLET, EXTENDED RELEASE ORAL at 08:27

## 2017-01-01 RX ADMIN — SCOPALAMINE 1 PATCH: 1 PATCH, EXTENDED RELEASE TRANSDERMAL at 22:34

## 2017-01-01 RX ADMIN — Medication 300 UNITS: at 15:08

## 2017-01-01 RX ADMIN — DEXAMETHASONE SODIUM PHOSPHATE: 10 INJECTION, SOLUTION INTRAMUSCULAR; INTRAVENOUS at 08:45

## 2017-01-01 RX ADMIN — FLUOROURACIL 812 MG: 50 INJECTION, SOLUTION INTRAVENOUS at 16:19

## 2017-01-01 RX ADMIN — LORAZEPAM 0.5 MG: 0.5 TABLET ORAL at 22:22

## 2017-01-01 RX ADMIN — TRASTUZUMAB 372 MG: KIT at 13:14

## 2017-01-01 RX ADMIN — LEUCOVORIN CALCIUM 812 MG: 200 INJECTION, POWDER, LYOPHILIZED, FOR SOLUTION INTRAMUSCULAR; INTRAVENOUS at 12:33

## 2017-01-01 RX ADMIN — LORAZEPAM 2 MG: 2 INJECTION INTRAMUSCULAR; INTRAVENOUS at 18:12

## 2017-01-01 RX ADMIN — SODIUM CHLORIDE 100 ML/HR: 0.9 INJECTION, SOLUTION INTRAVENOUS at 15:48

## 2017-01-01 RX ADMIN — IPRATROPIUM BROMIDE 0.5 MG: 0.5 SOLUTION RESPIRATORY (INHALATION) at 07:22

## 2017-01-01 RX ADMIN — ENOXAPARIN SODIUM 90 MG: 100 INJECTION SUBCUTANEOUS at 08:27

## 2017-01-01 RX ADMIN — FUROSEMIDE 40 MG: 10 INJECTION, SOLUTION INTRAMUSCULAR; INTRAVENOUS at 09:43

## 2017-01-01 RX ADMIN — Medication 361 MG: at 09:10

## 2017-01-01 RX ADMIN — MORPHINE SULFATE 15 MG: 15 TABLET ORAL at 17:42

## 2017-01-01 RX ADMIN — DENOSUMAB 120 MG: 120 INJECTION SUBCUTANEOUS at 11:25

## 2017-01-01 RX ADMIN — LEVALBUTEROL HYDROCHLORIDE 1.25 MG: 1.25 SOLUTION, CONCENTRATE RESPIRATORY (INHALATION) at 01:05

## 2017-01-01 RX ADMIN — IPRATROPIUM BROMIDE 0.5 MG: 0.5 SOLUTION RESPIRATORY (INHALATION) at 01:46

## 2017-01-01 RX ADMIN — GUAIFENESIN 600 MG: 600 TABLET, EXTENDED RELEASE ORAL at 22:36

## 2017-01-01 RX ADMIN — VANCOMYCIN HYDROCHLORIDE 1500 MG: 1 INJECTION, POWDER, LYOPHILIZED, FOR SOLUTION INTRAVENOUS at 12:31

## 2017-01-01 RX ADMIN — LEVALBUTEROL HYDROCHLORIDE 1.25 MG: 1.25 SOLUTION, CONCENTRATE RESPIRATORY (INHALATION) at 07:59

## 2017-01-01 RX ADMIN — POTASSIUM CHLORIDE 40 MEQ: 1500 TABLET, EXTENDED RELEASE ORAL at 13:32

## 2017-01-01 RX ADMIN — SODIUM CHLORIDE 20 ML/HR: 0.9 INJECTION, SOLUTION INTRAVENOUS at 08:35

## 2017-01-01 RX ADMIN — SODIUM CHLORIDE 20 ML/HR: 0.9 INJECTION, SOLUTION INTRAVENOUS at 08:21

## 2017-01-01 RX ADMIN — SODIUM CHLORIDE 100 ML/HR: 0.9 INJECTION, SOLUTION INTRAVENOUS at 23:39

## 2017-01-01 RX ADMIN — MORPHINE SULFATE 4 MG: 4 INJECTION, SOLUTION INTRAMUSCULAR; INTRAVENOUS at 13:23

## 2017-01-01 RX ADMIN — CEFEPIME 1000 MG: 1 INJECTION, POWDER, FOR SOLUTION INTRAMUSCULAR; INTRAVENOUS at 04:50

## 2017-01-01 RX ADMIN — POTASSIUM CHLORIDE 40 MEQ: 1500 TABLET, EXTENDED RELEASE ORAL at 17:42

## 2017-01-01 RX ADMIN — DEXAMETHASONE SODIUM PHOSPHATE: 10 INJECTION, SOLUTION INTRAMUSCULAR; INTRAVENOUS at 10:37

## 2017-01-01 RX ADMIN — FLUOROURACIL 792 MG: 50 INJECTION, SOLUTION INTRAVENOUS at 16:46

## 2017-01-01 RX ADMIN — IPRATROPIUM BROMIDE 0.5 MG: 0.5 SOLUTION RESPIRATORY (INHALATION) at 01:21

## 2017-01-01 RX ADMIN — DENOSUMAB 120 MG: 120 INJECTION SUBCUTANEOUS at 13:07

## 2017-01-01 RX ADMIN — IPRATROPIUM BROMIDE 0.5 MG: 0.5 SOLUTION RESPIRATORY (INHALATION) at 14:24

## 2017-01-01 RX ADMIN — GUAIFENESIN 600 MG: 600 TABLET, EXTENDED RELEASE ORAL at 08:50

## 2017-01-01 RX ADMIN — ISODIUM CHLORIDE 3 ML: 0.03 SOLUTION RESPIRATORY (INHALATION) at 20:30

## 2017-01-01 RX ADMIN — ACETAMINOPHEN 650 MG: 325 TABLET ORAL at 23:38

## 2017-01-01 RX ADMIN — HEPARIN SODIUM (PORCINE) LOCK FLUSH IV SOLN 100 UNIT/ML 300 UNITS: 100 SOLUTION at 14:28

## 2017-01-01 RX ADMIN — IPRATROPIUM BROMIDE 0.5 MG: 0.5 SOLUTION RESPIRATORY (INHALATION) at 13:08

## 2017-01-01 RX ADMIN — DOCUSATE SODIUM 100 MG: 100 CAPSULE, LIQUID FILLED ORAL at 09:04

## 2017-01-01 RX ADMIN — LORAZEPAM 0.25 MG: 0.5 TABLET ORAL at 09:05

## 2017-01-01 RX ADMIN — LEVALBUTEROL HYDROCHLORIDE 1.25 MG: 1.25 SOLUTION, CONCENTRATE RESPIRATORY (INHALATION) at 07:55

## 2017-01-01 RX ADMIN — FUROSEMIDE 40 MG: 10 INJECTION, SOLUTION INTRAMUSCULAR; INTRAVENOUS at 07:22

## 2017-01-01 RX ADMIN — SODIUM CHLORIDE 20 ML/HR: 0.9 INJECTION, SOLUTION INTRAVENOUS at 09:40

## 2017-01-01 RX ADMIN — IPRATROPIUM BROMIDE 0.5 MG: 0.5 SOLUTION RESPIRATORY (INHALATION) at 01:05

## 2017-01-01 RX ADMIN — LORAZEPAM 0.5 MG: 0.5 TABLET ORAL at 21:20

## 2017-01-01 RX ADMIN — MORPHINE SULFATE 30 MG: 30 TABLET, EXTENDED RELEASE ORAL at 08:50

## 2017-01-01 RX ADMIN — VANCOMYCIN HYDROCHLORIDE 1500 MG: 1 INJECTION, POWDER, LYOPHILIZED, FOR SOLUTION INTRAVENOUS at 02:05

## 2017-01-01 RX ADMIN — MORPHINE SULFATE 15 MG: 15 TABLET ORAL at 18:25

## 2017-01-01 RX ADMIN — LEVOFLOXACIN 750 MG: 5 INJECTION, SOLUTION INTRAVENOUS at 09:53

## 2017-01-01 RX ADMIN — SODIUM CHLORIDE 1000 ML: 0.9 INJECTION, SOLUTION INTRAVENOUS at 11:43

## 2017-01-01 RX ADMIN — LORAZEPAM 0.5 MG: 2 INJECTION INTRAMUSCULAR at 17:54

## 2017-01-01 RX ADMIN — DEXAMETHASONE SODIUM PHOSPHATE: 10 INJECTION, SOLUTION INTRAMUSCULAR; INTRAVENOUS at 12:45

## 2017-01-01 RX ADMIN — LEVALBUTEROL HYDROCHLORIDE 1.25 MG: 1.25 SOLUTION, CONCENTRATE RESPIRATORY (INHALATION) at 08:19

## 2017-01-01 RX ADMIN — GUAIFENESIN 600 MG: 600 TABLET, EXTENDED RELEASE ORAL at 22:22

## 2017-01-01 RX ADMIN — LEVALBUTEROL HYDROCHLORIDE 1.25 MG: 1.25 SOLUTION, CONCENTRATE RESPIRATORY (INHALATION) at 15:04

## 2017-01-01 RX ADMIN — SODIUM CHLORIDE 20 ML/HR: 900 INJECTION, SOLUTION INTRAVENOUS at 08:29

## 2017-01-01 RX ADMIN — PANTOPRAZOLE SODIUM 40 MG: 40 TABLET, DELAYED RELEASE ORAL at 06:12

## 2017-01-01 RX ADMIN — IPRATROPIUM BROMIDE 0.5 MG: 0.5 SOLUTION RESPIRATORY (INHALATION) at 07:59

## 2017-01-01 RX ADMIN — HEPARIN 300 UNITS: 100 SYRINGE at 13:00

## 2017-01-01 RX ADMIN — FLUOROURACIL 820 MG: 50 INJECTION, SOLUTION INTRAVENOUS at 12:26

## 2017-01-01 RX ADMIN — GUAIFENESIN 600 MG: 600 TABLET, EXTENDED RELEASE ORAL at 21:20

## 2017-01-01 RX ADMIN — IPRATROPIUM BROMIDE 0.5 MG: 0.5 SOLUTION RESPIRATORY (INHALATION) at 19:42

## 2017-01-01 RX ADMIN — HEPARIN 300 UNITS: 100 SYRINGE at 09:51

## 2017-01-01 RX ADMIN — TRASTUZUMAB 378 MG: KIT at 09:08

## 2017-01-01 RX ADMIN — SODIUM CHLORIDE 20 ML/HR: 0.9 INJECTION, SOLUTION INTRAVENOUS at 08:26

## 2017-01-01 RX ADMIN — IOHEXOL 100 ML: 350 INJECTION, SOLUTION INTRAVENOUS at 17:36

## 2017-01-01 RX ADMIN — Medication 379 MG: at 09:27

## 2017-01-01 RX ADMIN — SODIUM CHLORIDE 20 ML/HR: 0.9 INJECTION, SOLUTION INTRAVENOUS at 12:20

## 2017-01-01 RX ADMIN — SODIUM CHLORIDE 1000 ML: 0.9 INJECTION, SOLUTION INTRAVENOUS at 16:38

## 2017-01-01 RX ADMIN — FLUOROURACIL 832 MG: 50 INJECTION, SOLUTION INTRAVENOUS at 16:14

## 2017-01-01 RX ADMIN — HEPARIN 300 UNITS: 100 SYRINGE at 11:45

## 2017-01-01 RX ADMIN — FLUOROURACIL 832 MG: 50 INJECTION, SOLUTION INTRAVENOUS at 14:16

## 2017-01-01 RX ADMIN — SODIUM CHLORIDE 1000 ML: 0.9 INJECTION, SOLUTION INTRAVENOUS at 17:55

## 2017-01-01 RX ADMIN — SODIUM CHLORIDE 20 ML/HR: 0.9 INJECTION, SOLUTION INTRAVENOUS at 10:05

## 2017-01-01 RX ADMIN — LORAZEPAM 0.25 MG: 0.5 TABLET ORAL at 08:27

## 2017-01-01 RX ADMIN — LEVALBUTEROL HYDROCHLORIDE 1.25 MG: 1.25 SOLUTION, CONCENTRATE RESPIRATORY (INHALATION) at 02:06

## 2017-01-01 RX ADMIN — FUROSEMIDE 40 MG: 10 INJECTION, SOLUTION INTRAMUSCULAR; INTRAVENOUS at 13:40

## 2017-01-01 RX ADMIN — IPRATROPIUM BROMIDE 0.5 MG: 0.5 SOLUTION RESPIRATORY (INHALATION) at 07:55

## 2017-01-01 RX ADMIN — HYDROMORPHONE HYDROCHLORIDE 2 MG: 2 INJECTION, SOLUTION INTRAMUSCULAR; INTRAVENOUS; SUBCUTANEOUS at 18:11

## 2017-01-01 RX ADMIN — RIVAROXABAN 15 MG: 15 TABLET, FILM COATED ORAL at 09:05

## 2017-01-01 RX ADMIN — MORPHINE SULFATE 30 MG: 30 TABLET, EXTENDED RELEASE ORAL at 08:27

## 2017-01-01 RX ADMIN — PANTOPRAZOLE SODIUM 40 MG: 40 TABLET, DELAYED RELEASE ORAL at 06:09

## 2017-01-01 RX ADMIN — Medication 300 UNITS: at 15:29

## 2017-01-01 RX ADMIN — LORAZEPAM 0.25 MG: 0.5 TABLET ORAL at 13:32

## 2017-01-01 RX ADMIN — LEVOFLOXACIN 750 MG: 5 INJECTION, SOLUTION INTRAVENOUS at 10:30

## 2017-01-01 RX ADMIN — TRASTUZUMAB 354 MG: KIT at 11:21

## 2017-01-01 RX ADMIN — DENOSUMAB 120 MG: 120 INJECTION SUBCUTANEOUS at 12:45

## 2017-01-01 RX ADMIN — VANCOMYCIN HYDROCHLORIDE 1500 MG: 1 INJECTION, POWDER, LYOPHILIZED, FOR SOLUTION INTRAVENOUS at 14:44

## 2017-01-01 RX ADMIN — VANCOMYCIN HYDROCHLORIDE 1250 MG: 1 INJECTION, POWDER, LYOPHILIZED, FOR SOLUTION INTRAVENOUS at 17:53

## 2017-01-01 RX ADMIN — FLUOROURACIL 812 MG: 50 INJECTION, SOLUTION INTRAVENOUS at 14:38

## 2017-01-01 RX ADMIN — LORAZEPAM 0.5 MG: 2 INJECTION INTRAMUSCULAR; INTRAVENOUS at 14:24

## 2017-01-01 RX ADMIN — PANTOPRAZOLE SODIUM 40 MG: 40 TABLET, DELAYED RELEASE ORAL at 05:00

## 2017-01-01 RX ADMIN — FUROSEMIDE 40 MG: 10 INJECTION INTRAMUSCULAR; INTRAVENOUS at 18:15

## 2017-01-01 RX ADMIN — FLUOROURACIL 820 MG: 50 INJECTION, SOLUTION INTRAVENOUS at 14:14

## 2017-01-01 RX ADMIN — LORAZEPAM 1 MG: 2 INJECTION INTRAMUSCULAR; INTRAVENOUS at 18:06

## 2017-01-01 RX ADMIN — LORAZEPAM 0.5 MG: 2 INJECTION INTRAMUSCULAR at 07:28

## 2017-01-01 RX ADMIN — ISODIUM CHLORIDE 3 ML: 0.03 SOLUTION RESPIRATORY (INHALATION) at 15:04

## 2017-01-01 RX ADMIN — TRASTUZUMAB 361 MG: KIT at 10:58

## 2017-01-01 RX ADMIN — Medication 2 MG: at 18:11

## 2017-01-01 RX ADMIN — IPRATROPIUM BROMIDE 0.5 MG: 0.5 SOLUTION RESPIRATORY (INHALATION) at 13:27

## 2017-01-01 RX ADMIN — LORAZEPAM 0.25 MG: 0.5 TABLET ORAL at 14:34

## 2017-01-01 RX ADMIN — SODIUM CHLORIDE 20 ML/HR: 0.9 INJECTION, SOLUTION INTRAVENOUS at 08:10

## 2017-01-01 RX ADMIN — Medication 300 UNITS: at 15:40

## 2017-01-01 RX ADMIN — VANCOMYCIN HYDROCHLORIDE 1250 MG: 1 INJECTION, POWDER, LYOPHILIZED, FOR SOLUTION INTRAVENOUS at 01:03

## 2017-01-01 RX ADMIN — ENOXAPARIN SODIUM 90 MG: 100 INJECTION SUBCUTANEOUS at 21:19

## 2017-01-01 RX ADMIN — LEUCOVORIN CALCIUM 812 MG: 200 INJECTION, POWDER, LYOPHILIZED, FOR SOLUTION INTRAMUSCULAR; INTRAVENOUS at 14:21

## 2017-01-01 RX ADMIN — IPRATROPIUM BROMIDE 0.5 MG: 0.5 SOLUTION RESPIRATORY (INHALATION) at 13:04

## 2017-01-01 RX ADMIN — GUAIFENESIN 600 MG: 600 TABLET, EXTENDED RELEASE ORAL at 21:01

## 2017-01-01 RX ADMIN — VANCOMYCIN HYDROCHLORIDE 1250 MG: 1 INJECTION, POWDER, LYOPHILIZED, FOR SOLUTION INTRAVENOUS at 12:04

## 2017-01-01 RX ADMIN — CEFEPIME 1000 MG: 1 INJECTION, POWDER, FOR SOLUTION INTRAMUSCULAR; INTRAVENOUS at 15:06

## 2017-01-01 RX ADMIN — MORPHINE SULFATE 30 MG: 30 TABLET, EXTENDED RELEASE ORAL at 10:02

## 2017-01-01 RX ADMIN — LEVOFLOXACIN 750 MG: 5 INJECTION, SOLUTION INTRAVENOUS at 09:50

## 2017-01-01 RX ADMIN — MORPHINE SULFATE 4 MG: 4 INJECTION, SOLUTION INTRAMUSCULAR; INTRAVENOUS at 06:01

## 2017-01-01 RX ADMIN — LEVALBUTEROL HYDROCHLORIDE 1.25 MG: 1.25 SOLUTION, CONCENTRATE RESPIRATORY (INHALATION) at 13:04

## 2017-01-01 RX ADMIN — LEVALBUTEROL HYDROCHLORIDE 1.25 MG: 1.25 SOLUTION, CONCENTRATE RESPIRATORY (INHALATION) at 07:36

## 2017-01-01 RX ADMIN — HEPARIN 300 UNITS: 100 SYRINGE at 15:06

## 2017-01-01 RX ADMIN — LORAZEPAM 0.5 MG: 2 INJECTION INTRAMUSCULAR; INTRAVENOUS at 07:28

## 2017-01-01 RX ADMIN — ENOXAPARIN SODIUM 90 MG: 100 INJECTION SUBCUTANEOUS at 08:50

## 2017-01-01 RX ADMIN — TRASTUZUMAB 299 MG: 150 INJECTION, POWDER, LYOPHILIZED, FOR SOLUTION INTRAVENOUS at 14:00

## 2017-01-01 RX ADMIN — LEVALBUTEROL HYDROCHLORIDE 1.25 MG: 1.25 SOLUTION, CONCENTRATE RESPIRATORY (INHALATION) at 20:41

## 2017-01-01 RX ADMIN — DENOSUMAB 120 MG: 120 INJECTION SUBCUTANEOUS at 10:35

## 2017-01-01 RX ADMIN — SODIUM CHLORIDE 20 ML/HR: 0.9 INJECTION, SOLUTION INTRAVENOUS at 10:45

## 2017-01-01 RX ADMIN — FLUOROURACIL 836 MG: 50 INJECTION, SOLUTION INTRAVENOUS at 12:51

## 2017-01-01 RX ADMIN — LEVALBUTEROL HYDROCHLORIDE 1.25 MG: 1.25 SOLUTION, CONCENTRATE RESPIRATORY (INHALATION) at 01:21

## 2017-01-01 RX ADMIN — LORAZEPAM 0.5 MG: 2 INJECTION INTRAMUSCULAR; INTRAVENOUS at 22:36

## 2017-01-01 RX ADMIN — FUROSEMIDE 40 MG: 10 INJECTION, SOLUTION INTRAMUSCULAR; INTRAVENOUS at 17:23

## 2017-01-01 RX ADMIN — LORAZEPAM 0.5 MG: 2 INJECTION INTRAMUSCULAR; INTRAVENOUS at 17:54

## 2017-01-01 RX ADMIN — SODIUM CHLORIDE 20 ML/HR: 0.9 INJECTION, SOLUTION INTRAVENOUS at 08:20

## 2017-01-01 RX ADMIN — MORPHINE SULFATE 30 MG: 30 TABLET, EXTENDED RELEASE ORAL at 09:05

## 2017-01-01 RX ADMIN — LEVOFLOXACIN 750 MG: 5 INJECTION, SOLUTION INTRAVENOUS at 09:44

## 2017-01-01 RX ADMIN — SODIUM CHLORIDE 20 ML/HR: 900 INJECTION, SOLUTION INTRAVENOUS at 12:05

## 2017-01-01 RX ADMIN — RIVAROXABAN 15 MG: 15 TABLET, FILM COATED ORAL at 18:11

## 2017-01-01 RX ADMIN — LEUCOVORIN CALCIUM 820 MG: 200 INJECTION, POWDER, LYOPHILIZED, FOR SOLUTION INTRAMUSCULAR; INTRAVENOUS at 10:10

## 2017-01-01 RX ADMIN — LEVALBUTEROL HYDROCHLORIDE 1.25 MG: 1.25 SOLUTION, CONCENTRATE RESPIRATORY (INHALATION) at 01:46

## 2017-01-01 RX ADMIN — HEPARIN 300 UNITS: 100 SYRINGE at 10:27

## 2017-01-01 RX ADMIN — TRASTUZUMAB 361 MG: KIT at 09:04

## 2017-01-01 RX ADMIN — DOCUSATE SODIUM 100 MG: 100 CAPSULE, LIQUID FILLED ORAL at 18:25

## 2017-01-01 RX ADMIN — LEVALBUTEROL HYDROCHLORIDE 1.25 MG: 1.25 SOLUTION, CONCENTRATE RESPIRATORY (INHALATION) at 13:27

## 2017-01-01 RX ADMIN — ACETAMINOPHEN 650 MG: 325 TABLET ORAL at 14:09

## 2017-01-01 RX ADMIN — LEUCOVORIN CALCIUM 832 MG: 50 INJECTION, POWDER, LYOPHILIZED, FOR SOLUTION INTRAMUSCULAR; INTRAVENOUS at 11:35

## 2017-01-01 RX ADMIN — LEUCOVORIN CALCIUM 832 MG: 200 INJECTION, POWDER, LYOPHILIZED, FOR SOLUTION INTRAMUSCULAR; INTRAVENOUS at 14:18

## 2017-01-01 RX ADMIN — TRASTUZUMAB 372 MG: KIT at 10:30

## 2017-01-01 RX ADMIN — CEFEPIME 1000 MG: 1 INJECTION, POWDER, FOR SOLUTION INTRAMUSCULAR; INTRAVENOUS at 03:09

## 2017-01-01 RX ADMIN — TRASTUZUMAB 332 MG: 150 INJECTION, POWDER, LYOPHILIZED, FOR SOLUTION INTRAVENOUS at 13:39

## 2017-01-01 RX ADMIN — FLUOROURACIL 832 MG: 50 INJECTION, SOLUTION INTRAVENOUS at 12:57

## 2017-01-01 RX ADMIN — Medication 378 MG: at 08:43

## 2017-01-01 RX ADMIN — TRASTUZUMAB 372 MG: KIT at 09:45

## 2017-01-01 RX ADMIN — GLYCOPYRROLATE 0.1 MG: 0.2 INJECTION, SOLUTION INTRAMUSCULAR; INTRAVENOUS at 12:39

## 2017-01-01 RX ADMIN — DEXAMETHASONE SODIUM PHOSPHATE: 10 INJECTION, SOLUTION INTRAMUSCULAR; INTRAVENOUS at 08:46

## 2017-01-01 RX ADMIN — IPRATROPIUM BROMIDE 0.5 MG: 0.5 SOLUTION RESPIRATORY (INHALATION) at 19:39

## 2017-01-01 RX ADMIN — ISODIUM CHLORIDE 3 ML: 0.03 SOLUTION RESPIRATORY (INHALATION) at 07:59

## 2017-01-01 RX ADMIN — TRASTUZUMAB 378 MG: KIT at 09:20

## 2017-01-01 RX ADMIN — SODIUM CHLORIDE 20 ML/HR: 0.9 INJECTION, SOLUTION INTRAVENOUS at 08:22

## 2017-01-01 RX ADMIN — GUAIFENESIN 600 MG: 600 TABLET, EXTENDED RELEASE ORAL at 00:36

## 2017-01-01 RX ADMIN — GADOBUTROL 9 ML: 604.72 INJECTION INTRAVENOUS at 20:06

## 2017-01-01 RX ADMIN — MORPHINE SULFATE 8 MG: 4 INJECTION, SOLUTION INTRAMUSCULAR; INTRAVENOUS at 18:08

## 2017-01-01 RX ADMIN — VANCOMYCIN HYDROCHLORIDE 1500 MG: 1 INJECTION, POWDER, LYOPHILIZED, FOR SOLUTION INTRAVENOUS at 13:05

## 2017-01-01 RX ADMIN — FLUOROURACIL 820 MG: 50 INJECTION, SOLUTION INTRAVENOUS at 12:30

## 2017-01-01 RX ADMIN — LEVOFLOXACIN 750 MG: 5 INJECTION, SOLUTION INTRAVENOUS at 10:05

## 2017-01-01 RX ADMIN — LEUCOVORIN CALCIUM 820 MG: 200 INJECTION, POWDER, LYOPHILIZED, FOR SOLUTION INTRAMUSCULAR; INTRAVENOUS at 10:25

## 2017-01-01 RX ADMIN — VANCOMYCIN HYDROCHLORIDE 1250 MG: 1 INJECTION, POWDER, LYOPHILIZED, FOR SOLUTION INTRAVENOUS at 23:39

## 2017-01-01 RX ADMIN — RIVAROXABAN 15 MG: 15 TABLET, FILM COATED ORAL at 10:02

## 2017-01-01 RX ADMIN — SODIUM CHLORIDE 20 ML/HR: 0.9 INJECTION, SOLUTION INTRAVENOUS at 13:06

## 2017-01-01 RX ADMIN — LEUCOVORIN CALCIUM 792 MG: 200 INJECTION, POWDER, LYOPHILIZED, FOR SOLUTION INTRAMUSCULAR; INTRAVENOUS at 14:43

## 2017-01-01 RX ADMIN — HEPARIN 300 UNITS: 100 SYRINGE at 14:25

## 2017-01-01 RX ADMIN — DEXAMETHASONE SODIUM PHOSPHATE: 10 INJECTION, SOLUTION INTRAMUSCULAR; INTRAVENOUS at 08:57

## 2017-01-01 RX ADMIN — DEXAMETHASONE SODIUM PHOSPHATE: 10 INJECTION, SOLUTION INTRAMUSCULAR; INTRAVENOUS at 10:59

## 2017-01-01 RX ADMIN — Medication 300 UNITS: at 12:25

## 2017-01-01 RX ADMIN — LORAZEPAM 0.25 MG: 0.5 TABLET ORAL at 10:02

## 2017-01-01 RX ADMIN — LEVALBUTEROL HYDROCHLORIDE 1.25 MG: 1.25 SOLUTION, CONCENTRATE RESPIRATORY (INHALATION) at 19:42

## 2017-01-01 RX ADMIN — IPRATROPIUM BROMIDE 0.5 MG: 0.5 SOLUTION RESPIRATORY (INHALATION) at 20:41

## 2017-01-01 RX ADMIN — LEVALBUTEROL HYDROCHLORIDE 1.25 MG: 1.25 SOLUTION, CONCENTRATE RESPIRATORY (INHALATION) at 13:08

## 2017-01-01 RX ADMIN — GUAIFENESIN 600 MG: 600 TABLET, EXTENDED RELEASE ORAL at 09:04

## 2017-01-01 RX ADMIN — ENOXAPARIN SODIUM 90 MG: 100 INJECTION SUBCUTANEOUS at 21:20

## 2017-01-01 RX ADMIN — LORAZEPAM 0.5 MG: 2 INJECTION INTRAMUSCULAR; INTRAVENOUS at 05:00

## 2017-01-01 RX ADMIN — LEVALBUTEROL HYDROCHLORIDE 1.25 MG: 1.25 SOLUTION, CONCENTRATE RESPIRATORY (INHALATION) at 19:39

## 2017-01-01 RX ADMIN — LEVALBUTEROL HYDROCHLORIDE 1.25 MG: 1.25 SOLUTION, CONCENTRATE RESPIRATORY (INHALATION) at 20:30

## 2017-01-01 RX ADMIN — POTASSIUM CHLORIDE 20 MEQ: 1500 TABLET, EXTENDED RELEASE ORAL at 12:26

## 2017-01-01 RX ADMIN — LEVOFLOXACIN 750 MG: 5 INJECTION, SOLUTION INTRAVENOUS at 10:41

## 2017-01-01 RX ADMIN — IOHEXOL 85 ML: 350 INJECTION, SOLUTION INTRAVENOUS at 14:34

## 2017-01-01 RX ADMIN — SODIUM CHLORIDE 20 ML/HR: 0.9 INJECTION, SOLUTION INTRAVENOUS at 12:15

## 2017-01-01 RX ADMIN — SODIUM CHLORIDE 20 ML/HR: 0.9 INJECTION, SOLUTION INTRAVENOUS at 08:40

## 2017-01-01 RX ADMIN — ENOXAPARIN SODIUM 90 MG: 100 INJECTION SUBCUTANEOUS at 08:49

## 2017-01-01 RX ADMIN — ONDANSETRON: 2 INJECTION INTRAMUSCULAR; INTRAVENOUS at 10:02

## 2017-01-01 RX ADMIN — IPRATROPIUM BROMIDE 0.5 MG: 0.5 SOLUTION RESPIRATORY (INHALATION) at 19:58

## 2017-01-01 RX ADMIN — MORPHINE SULFATE 4 MG: 4 INJECTION, SOLUTION INTRAMUSCULAR; INTRAVENOUS at 22:34

## 2017-01-01 RX ADMIN — VANCOMYCIN HYDROCHLORIDE 1250 MG: 1 INJECTION, POWDER, LYOPHILIZED, FOR SOLUTION INTRAVENOUS at 11:11

## 2017-01-01 RX ADMIN — ENOXAPARIN SODIUM 90 MG: 100 INJECTION SUBCUTANEOUS at 22:37

## 2017-01-01 RX ADMIN — LORAZEPAM 2 MG: 2 INJECTION INTRAMUSCULAR at 18:12

## 2017-01-01 RX ADMIN — CEFEPIME HYDROCHLORIDE 2000 MG: 2 INJECTION, POWDER, FOR SOLUTION INTRAVENOUS at 14:15

## 2017-01-01 RX ADMIN — DEXAMETHASONE SODIUM PHOSPHATE: 10 INJECTION, SOLUTION INTRAMUSCULAR; INTRAVENOUS at 13:00

## 2017-01-01 RX ADMIN — LEVALBUTEROL HYDROCHLORIDE 1.25 MG: 1.25 SOLUTION, CONCENTRATE RESPIRATORY (INHALATION) at 07:22

## 2017-01-01 RX ADMIN — LORAZEPAM 0.5 MG: 0.5 TABLET ORAL at 21:01

## 2017-01-01 RX ADMIN — DENOSUMAB 120 MG: 120 INJECTION SUBCUTANEOUS at 10:24

## 2017-01-01 RX ADMIN — Medication 351 MG: at 13:10

## 2017-01-01 RX ADMIN — LEVALBUTEROL HYDROCHLORIDE 1.25 MG: 1.25 SOLUTION, CONCENTRATE RESPIRATORY (INHALATION) at 14:23

## 2017-01-01 RX ADMIN — IPRATROPIUM BROMIDE 0.5 MG: 0.5 SOLUTION RESPIRATORY (INHALATION) at 08:19

## 2017-01-01 RX ADMIN — PANTOPRAZOLE SODIUM 40 MG: 40 TABLET, DELAYED RELEASE ORAL at 06:19

## 2017-01-01 RX ADMIN — IOHEXOL 100 ML: 350 INJECTION, SOLUTION INTRAVENOUS at 13:19

## 2017-01-01 RX ADMIN — DEXAMETHASONE SODIUM PHOSPHATE: 10 INJECTION, SOLUTION INTRAMUSCULAR; INTRAVENOUS at 08:49

## 2017-01-03 NOTE — PROGRESS NOTES
CADD pump delivered by [facility] at 1345 dose=5016mg, ordered dose= 4992mg    Verified [facility] has orders received 12/30/16 at [phone] for DOS 1/3/17 signed by RN and MD on 12/1/16, Infusion has orders received this AM signed by RN and PA this AM   D/W Juni Amato RN, ok to use CADD pump dosed 5016mg, Time out done and Jnui Amato RN to send new orders now for CADD pump only

## 2017-01-03 NOTE — PROGRESS NOTES
Pt offers no complaints, seen by MD today and labs within parameters for herceptin in [facility] today   [facility] to deliver pump

## 2017-01-05 NOTE — PROGRESS NOTES
Pt offers no complaints, CADD pump disconnected per protocol, medical equipment request check, res volume 0ml, confirmed pts next appt, pt refused avs

## 2017-01-17 NOTE — PLAN OF CARE

## 2017-02-14 NOTE — PROGRESS NOTES
Pt is here for chemo and xgeva  He offers no complaints at this time  Labs meet parameters for both

## 2017-02-14 NOTE — PROGRESS NOTES
Pt tolerated his chemo and xgeva without any adverse reactions  Next appointment confirmed   Pt refused avs

## 2017-02-28 NOTE — PROGRESS NOTES
Pt offers no c/o --- feels well --- Herceptin today only -- pt has visit with MD and is good to go for today

## 2017-03-13 NOTE — PROGRESS NOTES
Caro Harrison per MD order: Lab work ( 03/10/17 ) reviewed: Calcium - 9 5: Confirms taking oral Calcium / Vitamin D: Injection given in [facility] without incident: No adverse reactions noted: Verified follow up appt with patient

## 2017-03-13 NOTE — PROGRESS NOTES
Patient to [facility] for Herceptin / Krunal Pizarro: Offers no complaints at present time: Lab work ( 03/10/17 ) reviewed:  Within parameters to treat: Left PAC accessed without difficulty

## 2017-03-28 NOTE — PROGRESS NOTES
Pt resting with no complaints  Vitals stable; labs within parameters for treatment  Callbell within reach; will continue to monitor

## 2017-04-11 NOTE — PROGRESS NOTES
Pt is here for chemo  He offers no complaints except pain he states from his waist to the top of his legs and left arm 5/10  He states this is not new and the pain meds are helping   Labs meet parameters for chemo

## 2017-04-25 NOTE — PROGRESS NOTES
Pt  Tolerated infusion of Herceptin, and Leucovin  5FU bolus given as ordered  5FU infusing via CADD Pump over next 46 hrs  Next appointment reviewed   Declined AVS

## 2017-04-25 NOTE — PLAN OF CARE
Problem: PAIN - ADULT  Goal: Verbalizes/displays adequate comfort level or baseline comfort level  Interventions:  - Encourage patient to monitor pain and request assistance  - Assess pain using appropriate pain scale  - Administer analgesics based on type and severity of pain and evaluate response  - Implement non-pharmacological measures as appropriate and evaluate response  - Consider cultural and social influences on pain and pain management  - Notify physician/advanced practitioner if interventions unsuccessful or patient reports new pain  Outcome: Progressing    Problem: INFECTION - ADULT  Goal: Absence or prevention of progression during hospitalization  INTERVENTIONS:  - Assess and monitor for signs and symptoms of infection  - Monitor lab/diagnostic results  - Monitor all insertion sites, i e  indwelling lines, tubes, and drains  - Monitor endotracheal (as able) and nasal secretions for changes in amount and color  - Milmay appropriate cooling/warming therapies per order  - Administer medications as ordered  - Instruct and encourage patient and family to use good hand hygiene technique  - Identify and instruct in appropriate isolation precautions for identified infection/condition  Outcome: Progressing  Goal: Absence of fever/infection during neutropenic period  INTERVENTIONS:  - Monitor WBC  - Implement neutropenic guidelines  Outcome: Progressing    Problem: Knowledge Deficit  Goal: Patient/family/caregiver demonstrates understanding of disease process, treatment plan, medications, and discharge instructions  Complete learning assessment and assess knowledge base    Interventions:  - Provide teaching at level of understanding  - Provide teaching via preferred learning methods  Outcome: Progressing

## 2017-04-25 NOTE — PROGRESS NOTES
Pt  Denies new symptoms or concerns at this time  Labs reviewed and within normal parameters for chemotherapy today    Herceptin, Leucovorin and 5FU ordered today

## 2017-05-09 NOTE — PROGRESS NOTES
Cata Frederick per MD order: Lab work ( 05/06/17 ) reviewed: Calcium - 8 7: Confirms taking oral Calcium / Vitamin D at home: Injection given in [facility] without incident: No adverse reactions noted: Verified follow up appt with patient

## 2017-05-09 NOTE — PROGRESS NOTES
Patient to [facility] for Herceptin / Leucovorin / 5 FU: Offers no complaints at present time: Lab work ( 05/06/17 ) reviewed:  Within parameters to treat: Left PAC accessed without difficulty: Good blood return noted

## 2017-05-23 NOTE — PROGRESS NOTES
5Fu IVP given over 10min, good bld  Every 2-3mls  Connected cadd pump after 2 Rn's checked dosages and settings  Reviewed disconnect date and time   Has schedule set up, declines AVS

## 2017-05-23 NOTE — PROGRESS NOTES
Pt here today for chemo, labs within parameters  On pain meds for disease progression, he said he maybe starting radiation soon to help controll the pain more

## 2017-06-06 NOTE — PROGRESS NOTES
Pt resting with no complaints  Vitals stable; labs within parameters for treatment  Nickkinjal Olvera RN notified that pt has increasing AL[facility] phos  Cleared for treatment today  Ca checked and level within parameters for XGeva  Callbell within reach; will continue to monitor

## 2017-06-06 NOTE — PROGRESS NOTES
Pt tolerated treatment well  CADD connected after two RN check   Pt declined AVS  Pt aware to return 6/8 at 1245

## 2017-06-08 NOTE — PROGRESS NOTES
CADD pump D/C'd and port flushed per protocol    Patient aware of upcoming appointments   Declined AVS

## 2017-06-27 NOTE — PLAN OF CARE
Problem: INFECTION - ADULT  Goal: Absence or prevention of progression during hospitalization  INTERVENTIONS:  - Assess and monitor for signs and symptoms of infection  - Monitor lab/diagnostic results  - Monitor all insertion sites, i e  indwelling lines, tubes, and drains  - Monitor endotracheal (as able) and nasal secretions for changes in amount and color  - Bernice appropriate cooling/warming therapies per order  - Administer medications as ordered  - Instruct and encourage patient and family to use good hand hygiene technique  - Identify and instruct in appropriate isolation precautions for identified infection/condition  Outcome: Progressing    Goal: Absence of fever/infection during neutropenic period  INTERVENTIONS:  - Monitor WBC  - Implement neutropenic guidelines  Outcome: Progressing      Problem: Knowledge Deficit  Goal: Patient/family/caregiver demonstrates understanding of disease process, treatment plan, medications, and discharge instructions  Complete learning assessment and assess knowledge base    Interventions:  - Provide teaching at level of understanding  - Provide teaching via preferred learning methods  Outcome: Progressing

## 2017-06-27 NOTE — PROGRESS NOTES
Pt  tolerated treatment without incident  Connected to CADD pump after independent verification of dose and settings by 2nd RN  Good blood return noted prior to connection  To return for disconnect @  1430 on 6/27/17

## 2017-07-10 PROBLEM — C16.9 GASTRIC CANCER (HCC): Status: ACTIVE | Noted: 2017-01-01

## 2017-07-10 PROBLEM — E87.1 HYPONATREMIA: Status: ACTIVE | Noted: 2017-01-01

## 2017-07-10 PROBLEM — R77.8 ELEVATED TROPONIN: Status: ACTIVE | Noted: 2017-01-01

## 2017-07-10 PROBLEM — E87.2 LACTIC ACIDOSIS: Status: ACTIVE | Noted: 2017-01-01

## 2017-07-10 PROBLEM — J18.9 PNEUMONIA: Status: ACTIVE | Noted: 2017-01-01

## 2017-07-10 PROBLEM — K21.9 GERD (GASTROESOPHAGEAL REFLUX DISEASE): Status: ACTIVE | Noted: 2017-01-01

## 2017-07-10 PROBLEM — J96.01 ACUTE RESPIRATORY FAILURE WITH HYPOXIA (HCC): Status: ACTIVE | Noted: 2017-01-01

## 2017-07-10 PROBLEM — C79.51 METASTASIS TO SPINAL COLUMN (HCC): Status: ACTIVE | Noted: 2017-01-01

## 2017-07-13 PROBLEM — G89.29 CHRONIC PAIN: Status: ACTIVE | Noted: 2017-01-01

## 2017-07-13 PROBLEM — E87.1 HYPONATREMIA: Status: RESOLVED | Noted: 2017-01-01 | Resolved: 2017-01-01

## 2017-07-13 PROBLEM — G89.29 CHRONIC PAIN: Chronic | Status: ACTIVE | Noted: 2017-01-01

## 2017-07-13 PROBLEM — Z86.711 HISTORY OF PULMONARY EMBOLUS (PE): Status: ACTIVE | Noted: 2017-01-01

## 2017-07-13 PROBLEM — C16.9 GASTRIC CANCER (HCC): Chronic | Status: ACTIVE | Noted: 2017-01-01

## 2017-07-13 PROBLEM — I21.4 NSTEMI (NON-ST ELEVATED MYOCARDIAL INFARCTION) (HCC): Status: ACTIVE | Noted: 2017-01-01

## 2017-07-13 PROBLEM — F11.90 CHRONIC, CONTINUOUS USE OF OPIOIDS: Status: ACTIVE | Noted: 2017-01-01

## 2017-07-13 PROBLEM — D64.9 ANEMIA: Status: ACTIVE | Noted: 2017-01-01

## 2017-07-13 PROBLEM — E87.2 LACTIC ACIDOSIS: Status: RESOLVED | Noted: 2017-01-01 | Resolved: 2017-01-01

## 2017-07-13 PROBLEM — Z86.711 HISTORY OF PULMONARY EMBOLUS (PE): Chronic | Status: ACTIVE | Noted: 2017-01-01

## 2017-07-13 PROBLEM — K21.9 GERD (GASTROESOPHAGEAL REFLUX DISEASE): Chronic | Status: ACTIVE | Noted: 2017-01-01

## 2017-07-13 PROBLEM — D64.9 ANEMIA: Chronic | Status: ACTIVE | Noted: 2017-01-01

## 2017-07-13 PROBLEM — E87.6 HYPOKALEMIA: Status: ACTIVE | Noted: 2017-01-01

## 2017-07-15 PROBLEM — R77.8 ELEVATED TROPONIN: Status: RESOLVED | Noted: 2017-01-01 | Resolved: 2017-01-01

## 2017-07-15 PROBLEM — J18.9 PNEUMONIA: Status: RESOLVED | Noted: 2017-01-01 | Resolved: 2017-01-01

## 2017-07-15 PROBLEM — E87.6 HYPOKALEMIA: Status: RESOLVED | Noted: 2017-01-01 | Resolved: 2017-01-01

## 2017-07-26 NOTE — PROGRESS NOTES
Patient tolerated chemotherapy today without complications  Good blood return before, during, and after 5FU push  Patient connected to CADD pump for 46 hour infusion of 5FU  All connections secured with taPE  Green indicator light verified blinking before d/c  Patient aware to Patricia Felton on Friday at [phone] for disconnect   Declined AVS

## 2017-07-26 NOTE — PROGRESS NOTES
Nurse spoke w/Shannan Robertson RN & informed her of pts  AST of 65 & alk phos of 188 & Tony Maggi stated Cole Magana is on hold until pt  Seen by Dr Santa Wong 7/28/17

## 2017-07-26 NOTE — PROGRESS NOTES
Nurse spoke w/Shannan Kaur RN & informed her pt  Does not have a CMP resulted at this time  Pt has labs done today and CMP @ [facility] and still awaiting results  Leatha Farias discussed the same w/Dr Cheryl Delacruz & he stated it was o k  To treat pt  Today w/out CMP results  Nurse informed the pt  Of the same, pt  Agreeable &  verbalized understanding

## 2017-08-11 PROBLEM — J90 BILATERAL PLEURAL EFFUSION: Chronic | Status: ACTIVE | Noted: 2017-01-01

## 2017-08-11 PROBLEM — I63.9 ACUTE THROMBOEMBOLIC CEREBROVASCULAR ACCIDENT (CVA) (HCC): Status: ACTIVE | Noted: 2017-01-01

## 2017-08-11 NOTE — ED NOTES
Gerhardt Ship, respiratory at bedside, placed pt on high flow NC, pt O2 sat increased to 95%, pt more comfortable, stated ease of breathing with high flow NC, pt wife at bedside, will continue to monitor     Amanda Murdock RN  08/11/17 [id number]

## 2017-08-11 NOTE — ED NOTES
Pt family remains at bedside with no needs at this time, pt remains comfortable looking in no distress, will continue to monitor, O2 sats remain in low 60's, HR continues in 130-140's, Dr Ben Willams aware of pt status     Quang Zapata RN  08/11/17 192

## 2017-08-11 NOTE — ED PROVIDER NOTES
History  Chief Complaint   Patient presents with    CVA/TIA-like Symptoms     Pt presents with R sided weakness, last known well time 0200, Pt wife states that she went to check on him at that time and noticed his R facial droop but patient did not want to come to the hospital     Patient is a 46year old male history of cancer who presents with a dense hemipalegia of the right side that started at 2 am last night  The wife noticed this but the patient refused to come to the hospital He also notes shortness of breath  He has an expressive and receptive aphasia  Normal babinski  He is awake but responds yes to many questions and is not a reliable historian  Spoke with the wife, patient is DNR DNI- he needed to be placed on hi [place]uan oxygen for low o2 sats and pending respiratory failure  No vomiting or diarrhea but he did have a small amount of epigastric tenderness on exam  He has an elevated troponin  Spoke with dr Neita Boast with neurology no indication for tpa given time of onset, will get ct head     MDM 46 yom, respiratory distress, will get ct scans to rule out pe and pna, currently with a dense stroke   Patient with pneumonia on CT scan, spoke with radiology regarding findings as well as his stroke  Family, wife, friends at bedside and patient developed worsening severe respiratory distress with severe anxiety  O2 saturations were in the 90s on high flow oxygen and he began to pull his cannula off becoming worse and worse  Attempted small dose of Ativan and morphine and Trileptal BiPAP patient became more tachypneic at rates of 40s and 50s, in severe respiratory distress   Family at bedside had one more discussion about intubation and they report that they had a clear conversation with the patient that they would not want intubation, as such medication was given until patient was comfortable, patient did require large doses before he was not having severe distress                        Prior to Admission Medications   Prescriptions Last Dose Informant Patient Reported? Taking?   docusate sodium (COLACE) 50 mg capsule   Yes Yes   Sig: Take by mouth 2 (two) times a day as needed for constipation   morphine (MS CONTIN) 30 mg 12 hr tablet   Yes Yes   Sig: Take 30 mg by mouth daily     morphine (MSIR) 15 mg tablet   Yes Yes   Sig: Take 15 mg by mouth every evening   oxyCODONE (OXY-IR) 5 MG capsule   Yes Yes   Sig: Take 10 mg by mouth every 4 (four) hours as needed for moderate pain     pantoprazole (PROTONIX) 40 mg tablet   Yes Yes   Sig: Take 40 mg by mouth daily  rivaroxaban (XARELTO) tablet   Yes Yes   Sig: Take 15 mg by mouth daily with breakfast     senna (SENOKOT) 8 6 mg   No Yes   Sig: Take 2 tablets by mouth daily as needed for constipation      Facility-Administered Medications: None       Past Medical History:   Diagnosis Date    Alcohol use     Beer    Cancer associated pain     Constipation due to opioid therapy     Gastric cancer     GERD (gastroesophageal reflux disease)     Hernia, inguinal     Hx of radiation therapy     Opioid dependence, continuous     Osseous metastasis     Pulmonary embolus     T8 vertebral fracture        Past Surgical History:   Procedure Laterality Date    PORTACATH PLACEMENT Right        Family History   Problem Relation Age of Onset    Heart disease Mother      I have reviewed and agree with the history as documented  Social History   Substance Use Topics    Smoking status: Current Every Day Smoker     Packs/day: 1 00     Years: 30 00     Types: Cigarettes    Smokeless tobacco: Not on file    Alcohol use Yes      Comment: occasional        Review of Systems   Constitutional: Negative for chills and fever  HENT: Negative for ear pain and hearing loss  Respiratory: Positive for cough, shortness of breath and wheezing  Negative for chest tightness  Cardiovascular: Negative for chest pain and leg swelling     Gastrointestinal: Negative for abdominal pain, diarrhea and nausea  Genitourinary: Negative for dysuria and hematuria  Musculoskeletal: Negative for joint swelling and neck stiffness  Skin: Negative for rash  Neurological: Positive for facial asymmetry, weakness and numbness  Negative for seizures and headaches  Psychiatric/Behavioral: Negative for hallucinations and suicidal ideas  All other systems reviewed and are negative  Physical Exam  ED Triage Vitals   Temperature Pulse Respirations Blood Pressure SpO2   08/11/17 1656 08/11/17 1611 08/11/17 1611 08/11/17 1611 08/11/17 1611   99 4 °F (37 4 °C) (!) 133 (!) 40 123/81 (!) 79 %      Temp Source Heart Rate Source Patient Position BP Location FiO2 (%)   08/11/17 1656 08/11/17 1611 08/11/17 1611 08/11/17 1611 --   Rectal Monitor Sitting Right arm       Pain Score       08/11/17 1611       No Pain           Physical Exam   Constitutional: He is oriented to person, place, and time  He appears well-developed and well-nourished  He appears distressed  HENT:   Head: Normocephalic and atraumatic  Eyes: EOM are normal  Pupils are equal, round, and reactive to light  Neck: Normal range of motion  Neck supple  Cardiovascular: Regular rhythm and normal heart sounds  No murmur heard  tachycardic   Pulmonary/Chest: He is in respiratory distress  He has wheezes  He has rales  Abdominal: Soft  Bowel sounds are normal  He exhibits no distension  There is tenderness  Mild upper abdominal tenderness   Musculoskeletal: Normal range of motion  He exhibits no edema or tenderness  Neurological: He is alert and oriented to person, place, and time  No cranial nerve deficit  Coordination normal    Skin: Skin is warm and dry  He is not diaphoretic  No erythema  Psychiatric:   Anxious and in severe distress   Nursing note and vitals reviewed        ED Medications  Medications   piperacillin-tazobactam (ZOSYN) 4 5 g in sodium chloride 0 9 % 100 mL IVPB (0 g Intravenous Hold 8/11/17 6224)   HYDROmorphone (DILAUDID) 2 mg/mL injection **AcuDose Override Pull** (  Not Given 8/11/17 2106)   LORazepam (ATIVAN) 2 mg/mL injection 1 mg (not administered)   morphine (PF) 4 mg/mL injection 4 mg (4 mg Intravenous Given 8/11/17 2234)   scopolamine (TRANSDERM-SCOP) 1 5 mg/3 days TD 72 hr patch 1 patch (1 patch Transdermal Medication Applied 8/11/17 2234)   sodium chloride 0 9 % bolus 1,000 mL (0 mL Intravenous Stopped 8/11/17 1755)   vancomycin (VANCOCIN) 1,250 mg in sodium chloride 0 9 % 250 mL IVPB (0 mg/kg × 77 6 kg Intravenous Stopped 8/11/17 1816)   sodium chloride 0 9 % bolus 1,000 mL (0 mL Intravenous Stopped 8/11/17 1816)   iohexol (OMNIPAQUE) 350 MG/ML injection (SINGLE-DOSE) 100 mL (100 mL Intravenous Given 8/11/17 1736)   morphine (PF) 4 mg/mL injection **AcuDose Override Pull** (8 mg Intravenous Given 8/11/17 1808)   ondansetron (ZOFRAN) injection 4 mg (4 mg Intravenous Given 8/11/17 1817)   furosemide (LASIX) injection 40 mg (40 mg Intravenous Given 8/11/17 1815)   LORazepam (ATIVAN) 2 mg/mL injection 2 mg (2 mg Intravenous Given 8/11/17 1812)   HYDROmorphone (DILAUDID) 2 mg/mL injection 2 mg (2 mg Intravenous Given 8/11/17 1811)   morphine (PF) 10 mg/mL injection 8 mg (8 mg Intravenous Given 8/11/17 1808)   LORazepam (ATIVAN) 2 mg/mL injection 1 mg (1 mg Intravenous Given 8/11/17 1806)   morphine (PF) 4 mg/mL injection 4 mg (4 mg Intravenous Given 8/11/17 1805)   LORazepam (ATIVAN) 2 mg/mL injection 0 5 mg (0 5 mg Intravenous Given 8/11/17 1754)   morphine (PF) 4 mg/mL injection 4 mg (4 mg Intravenous Given 8/11/17 1754)       Diagnostic Studies  Labs Reviewed   CBC AND DIFFERENTIAL - Abnormal        Result Value Ref Range Status    WBC 18 62 (*) 4 31 - 10 16 Thousand/uL Final    RBC 3 68 (*) 3 88 - 5 62 Million/uL Final    Hemoglobin 10 4 (*) 12 0 - 17 0 g/dL Final    Hematocrit 33 8 (*) 36 5 - 49 3 % Final    MCHC 30 8 (*) 31 4 - 37 4 g/dL Final    RDW 21 0 (*) 11 6 - 15 1 % Final    Platelets 41 (*) 033 - 390 Thousands/uL Final    Comment: This result has been called to [facility] by Joey Hartmann on 08 11 2017 at [phone], and has been read back  MCV 92  82 - 98 fL Final    MCH 28 3  26 8 - 34 3 pg Final   COMPREHENSIVE METABOLIC PANEL - Abnormal     CO2 19 (*) 21 - 32 mmol/L Final    Anion Gap 18 (*) 4 - 13 mmol/L Final    Calcium 8 1 (*) 8 3 - 10 1 mg/dL Final     (*) 5 - 45 U/L Final    Comment:   Specimen collection should occur prior to Sulfasalazine administration due to the potential for falsely depressed results  Alkaline Phosphatase 260 (*) 46 - 116 U/L Final    Total Protein 5 8 (*) 6 4 - 8 2 g/dL Final    Albumin 2 3 (*) 3 5 - 5 0 g/dL Final    Sodium 138  136 - 145 mmol/L Final    Potassium 4 2  3 5 - 5 3 mmol/L Final    Chloride 101  100 - 108 mmol/L Final    BUN 20  5 - 25 mg/dL Final    Creatinine 1 17  0 60 - 1 30 mg/dL Final    Comment: Standardized to IDMS reference method    Glucose 87  65 - 140 mg/dL Final    Comment:   If the patient is fasting, the ADA then defines impaired fasting glucose as > 100 mg/dL and diabetes as > or equal to 123 mg/dL  Specimen collection should occur prior to Sulfasalazine administration due to the potential for falsely depressed results  Specimen collection should occur prior to Sulfapyridine administration due to the potential for falsely elevated results  ALT 31  12 - 78 U/L Final    Comment:   Specimen collection should occur prior to Sulfasalazine administration due to the potential for falsely depressed results  Total Bilirubin 0 70  0 20 - 1 00 mg/dL Final    eGFR 71  ml/min/1 73sq m Final    Narrative:     National Kidney Disease Education Program recommendations are as follows:  GFR calculation is accurate only with a steady state creatinine  Chronic Kidney disease less than 60 ml/min/1 73 sq  meters  Kidney failure less than 15 ml/min/1 73 sq  meters     PROTIME-INR - Abnormal     Protime 22 0 (*) 12 1 - 14 4 seconds Final    INR 1 85 (*) 0 86 - 1 16 Final   LACTIC ACID, PLASMA - Abnormal     LACTIC ACID 3 8 (*) 0 5 - 2 0 mmol/L Final    Narrative:     Result may be elevated if tourniquet was used during collection  NT-BNP PRO (BRAIN NATRIURETIC PEPTIDE) - Abnormal     NT-proBNP 2,725 (*) <125 pg/mL Final   POCT CHEM 8+ - Abnormal     CO2, i-STAT 19 (*) 21 - 32 mmol/L Final    Anion Gap, Istat 20 (*) 4 - 13 mmol/L Final    Calcium, Ionized i-STAT 1 04 (*) 1 12 - 1 32 mmol/L Final    Hct, i-STAT 33 (*) 36 5 - 49 3 % Final    Hgb, i-STAT 11 2 (*) 12 0 - 17 0 g/dl Final    SODIUM, I-STAT 136  136 - 145 mmol/l Final    Potassium, i-STAT 4 2  3 5 - 5 3 mmol/L Final    Chloride, istat 103  100 - 108 mmol/L Final    BUN, I-STAT 19  5 - 25 mg/dl Final    Creatinine, i-STAT 1 1  0 6 - 1 3 mg/dl Final    eGFR 76  ml/min/1 73sq m Final    Glucose, i-STAT 88  65 - 140 mg/dl Final    Specimen Type VENOUS   Final   POCT TROPONIN - Abnormal     POC Troponin I 1 85 (*) 0 00 - 0 08 ng/ml Final    Specimen Type VENOUS   Final    Narrative:     [employer] i-Stat handheld analyzer 99% cutoff is > 0 08ng/mL in network Emergency Departments    o cTnI 99% cutoff is useful only when applied to patients in the clinical setting of myocardial ischemia  o cTnI 99% cutoff should be interpreted in the context of clinical history, ECG findings and possibly cardiac imaging to establish correct diagnosis  o cTnI 99% cutoff may be suggestive but clearly not indicative of a coronary event without the clinical setting of myocardial ischemia     MANUAL DIFFERENTIAL(PHLEBS DO NOT ORDER) - Abnormal     Segmented % 79 (*) 43 - 75 % Final    Bands % 13 (*) 0 - 8 % Final    Lymphocytes % 3 (*) 14 - 44 % Final    Monocytes % 1 (*) 4 - 12 % Final    Myelocytes % 3 (*) 0 - 1 % Final    Promyelocytes % 1 (*) 0 - 0 % Final    Absolute Neutrophils 17 13 (*) 1 85 - 7 62 Thousand/uL Final    Lymphocytes Absolute 0 56 (*) 0 60 - 4 47 Thousand/uL Final    nRBC 3 (*) 0 - 2 /100 WBC Final    Platelet Estimate Decreased (*) Adequate Final    Eosinophils % 0  0 - 6 % Final    Basophils % 0  0 - 1 % Final    Monocytes Absolute 0 19  0 00 - 1 22 Thousand/uL Final    Eosinophils Absolute 0 00  0 00 - 0 40 Thousand/uL Final    Basophils Absolute 0 00  0 00 - 0 10 Thousand/uL Final    Total Counted 100   Final    Hypersegmented Neutrophils Present   Final    Smudge Cells Present   Final    Anisocytosis Present   Final    Hypochromia Present   Final    Macrocytes Present   Final    Polychromasia Present   Final   APTT - Normal    PTT 34  23 - 35 seconds Final    Narrative: Therapeutic Heparin Range = 60-90 seconds   BLOOD CULTURE   BLOOD CULTURE       PE Study with CT Abdomen and Pelvis with contrast   Final Result         1  No evidence of pulmonary embolism  2  Extensive bilateral pulmonary consolidation likely pneumonia  Bilateral pleural effusions are also noted  3   Progressive adenopathy in the upper abdomen with new ill-defined areas of low-density in the liver likely liver metastasis  4   Extensive bone metastasis  Workstation performed: [id number]         CT head wo contrast   Final Result      Findings suspicious for recent/evolving infarct involving the left frontal opercular region and insular cortex           ##cfslh   I personally discussed this result with Mary Zhang on 8/11/2017 6:02 PM    ##         Workstation performed: [id number]             Procedures  CriticalCare Time  Performed by: Lul Mart  Authorized by: Lul Mart     Critical care provider statement:     Critical care time (minutes):  85    Critical care time was exclusive of:  Separately billable procedures and treating other patients and teaching time    Critical care was necessary to treat or prevent imminent or life-threatening deterioration of the following conditions:  Sepsis and respiratory failure    Critical care was time spent personally by me on the following activities: Blood draw for specimens, obtaining history from patient or surrogate, development of treatment plan with patient or surrogate, evaluation of patient's response to treatment, examination of patient, re-evaluation of patient's condition, ordering and review of laboratory studies, ordering and performing treatments and interventions and review of old charts            Phone Contacts  ED Phone Contact    ED Course  ED Course   Amilcar Pabon's Documentation   Comment Time   Findings suspicious for recent/evolving infarct involving the left frontal opercular region and insular cortex  08/11 1824   Patient with pneumonia on CT scan, spoke with radiology regarding findings as well as his stroke  Family, wife, friends at bedside and patient developed worsening severe respiratory distress with severe anxiety  O2 saturations were in the 90s on high flow oxygen and he began to pull his cannula off becoming worse and worse  Attempted small dose of Ativan and morphine and Trileptal BiPAP patient became more tachypneic at rates of 40s and 50s, in severe respiratory distress  Family at bedside had one more discussion about intubation and they report that they had a clear conversation with the patient that they would not want intubation, as such medication was given until patient was comfortable, patient did require large doses before he was not having severe distress  08/11 1846                               [facility]  CritCare Time    Disposition  Final diagnoses:   Pneumonia   Respiratory failure     ED Disposition     ED Disposition Condition Comment    Admit  Case was discussed with slim pa and the patient's admission status was agreed to be Admission Status: inpatient status to the service of Dr Francisco Counter           Follow-up Information    None       Current Discharge Medication List      CONTINUE these medications which have NOT CHANGED    Details   docusate sodium (COLACE) 50 mg capsule Take by mouth 2 (two) times a day as needed for constipation      morphine (MS CONTIN) 30 mg 12 hr tablet Take 30 mg by mouth daily        morphine (MSIR) 15 mg tablet Take 15 mg by mouth every evening      oxyCODONE (OXY-IR) 5 MG capsule Take 10 mg by mouth every 4 (four) hours as needed for moderate pain        pantoprazole (PROTONIX) 40 mg tablet Take 40 mg by mouth daily  rivaroxaban (XARELTO) tablet Take 15 mg by mouth daily with breakfast        senna (SENOKOT) 8 6 mg Take 2 tablets by mouth daily as needed for constipation  Qty: 120 each, Refills: 0           No discharge procedures on file      ED Provider  Electronically Signed by       Stepan Peralta MD  08/12/17 2628

## 2017-08-11 NOTE — ED NOTES
Dr Ben Willams speaking to wife about patient DNR status, patient placed on bi-pap, pt continues to pull on all lines, pt L hand held by staff, Dr Ben Willams at bedside with family, pt will be comfort care only, med orders being given by Dr Ben Willams to make patient more comfortable     Quang Zapata RN  08/11/17 1824

## 2017-08-11 NOTE — ED NOTES
Pt to CT with nurse on monitor and respiratory therapist on high flow NC     Laura Mccullough RN  08/11/17 6514

## 2017-08-11 NOTE — ED NOTES
Pt returned from [address] with nurse/respiratory therapist, Pt states he has increased resp distress, pt pulling at all his lines, removing high flow NC from nose, Dr Nadia Wilkerson brought to bedside, respiratory therapy called to place patient on [address] Elle, MADHURI  08/11/17 1919

## 2017-08-11 NOTE — ED NOTES
Pt removed from Bi-Pap with Dr Maryam Arnold at bedside, pt O2 sat at 81% on RA, pt comfort care only, pt family at bedside, all IVF/abx stopped     Fawn Buenrostro RN  08/11/17 8600

## 2017-08-12 NOTE — PLAN OF CARE
Problem: Potential for Falls  Goal: Patient will remain free of falls  INTERVENTIONS:  - Assess patient frequently for physical needs  -  Identify cognitive and physical deficits and behaviors that affect risk of falls  -  [place] fall precautions as indicated by assessment   - Educate patient/family on patient safety including physical limitations  - Instruct patient to call for assistance with activity based on assessment  - Modify environment to reduce risk of injury  - Consider OT/PT consult to assist with strengthening/mobility   Outcome: Progressing      Problem: Prexisting or High Potential for Compromised Skin Integrity  Goal: Skin integrity is maintained or improved  INTERVENTIONS:  - Identify patients at risk for skin breakdown  - Assess and monitor skin integrity  - Assess and monitor nutrition and hydration status  - Monitor labs (i e  albumin)  - Assess for incontinence   - Turn and reposition patient  - Assist with mobility/ambulation  - Relieve pressure over bony prominences  - Avoid friction and shearing  - Provide appropriate hygiene as needed including keeping skin clean and dry  - Evaluate need for skin moisturizer/barrier cream  - Collaborate with interdisciplinary team (i e  Nutrition, Rehabilitation, etc )   - Patient/family teaching   Outcome: Progressing      Problem: Nutrition/Hydration-ADULT  Goal: Nutrient/Hydration intake appropriate for improving, restoring or maintaining nutritional needs  Monitor and assess patient's nutrition/hydration status for malnutrition (ex- brittle hair, bruises, dry skin, pale skin and conjunctiva, muscle wasting, smooth red tongue, and disorientation)  Collaborate with interdisciplinary team and initiate plan and interventions as ordered  Monitor patient's weight and dietary intake as ordered or per policy  Utilize nutrition screening tool and intervene per policy   Determine patient's food preferences and provide high-protein, high-caloric foods as appropriate       INTERVENTIONS:  - Monitor oral intake, urinary output, labs, and treatment plans  - Assess nutrition and hydration status and recommend course of action  - Evaluate amount of meals eaten  - Assist patient with eating if necessary   - Allow adequate time for meals  - Recommend/ encourage appropriate diets, oral nutritional supplements, and vitamin/mineral supplements  - Order, calculate, and assess calorie counts as needed  - Recommend, monitor, and adjust tube feedings and TPN/PPN based on assessed needs  - Assess need for intravenous fluids  - Provide specific nutrition/hydration education as appropriate  - Include patient/family/caregiver in decisions related to nutrition   Outcome: Not Progressing

## 2017-08-12 NOTE — DISCHARGE SUMMARY
Discharge Summary - [facility]    Patient Information: Joelle Pearce 46 y o  male MRN: [id number]  Unit/Bed#: -01 Encounter: [id number]    Discharging Physician / Practitioner: Fidelia Braxton MD  PCP: Ramy Cantu DO  Admission Date: 8/11/2017  Discharge Date: 08/12/17    Reason for Admission: unresponsiveness    Discharge Diagnoses:     Principal Problem:    Acute respiratory failure with hypoxia  Active Problems:    Bilateral pneumonia    Gastric cancer    Acute thromboembolic cerebrovascular accident (CVA)    Bilateral pleural effusion  Resolved Problems:    * No resolved hospital problems  *      Consultations During Hospital Stay:  · Hospice    Procedures Performed:     · none    Significant Findings / Test Results:     · CT angio PE chest with abdomen & pelvis: 1  No evidence of pulmonary embolism  2  Extensive bilateral pulmonary consolidation likely pneumonia  Bilateral pleural effusions are also noted  3   Progressive adenopathy in the upper abdomen with new ill-defined areas of low-density in the liver likely liver metastasis  4   Extensive bone metastasis  · CT head: Findings suspicious for recent/evolving infarct involving the left frontal opercular region and insular cortex    Incidental Findings:   · As above     Test Results Pending at Discharge (will require follow up):   · none     Outpatient Tests Requested:  · none    Complications:  none    Hospital Course:  [address] DEATH PRONOUNCEMENT NOTE      Joelle Pearce is a 46 y o  male patient who originally presented to the hospital on 8/11/2017 due to right facial weakness & right lower extremity weakness & further evaluation  He has h/o metastatic gastric Cancer being followed by Dr Darshan Carmona & currently on chemo  On admission, found to be tachycardic 246/131, respiratory rate 46,oxygen saturation at 79% on room air   Head CT noted findings suspicious for recent/evolving infarcts of left frontal opercular region and insular cortex  CTA chest PE no acute PE  evidence of pulmonary embolism, severe bilateral pulmonary consolidation likely pneumonia, bilateral pleural effusions, progressive adenopathy and upper abdomen with new ill-defined low densities in the liver likely liver metastasis, extensive bone metastasis    After all discussion with ED physician patients POA (wife) Bette Bills had decided to withdraw & stop all life sustaining measures & focus on comfort goals for him  Hence was admitted for hospice evaluation & plan  He was being given IV morphine to help his respiratory distress, scopolamine patch to decrease secretions, & one dose glycopyrrolate due to worsening secretions    With all the measures, patient naturally & comfortably passed away  I saw after being called by nurse, he had no response to painful stimuli, pupils bilaterally fixed & dilated, no corneal reflex, on auscultation had no heart sounds or spontaneous breathing, no palpable radial or carotid pulse  He was pronounced dead at 3:15 PM  Wife at the bedside was informed  He would not be a candidate for organ donation  No autopsy  Wife will call the  home    Condition at Discharge: dead     Discharge Day Visit / Exam:     * Please refer to separate progress note for these details *    Discussion with Family: wife      Discharge instructions/Information to patient and family:   See after visit summary for information provided to patient and family  Provisions for Follow-Up Care:  See after visit summary for information related to follow-up care and any pertinent home health orders  Disposition:     Other: death    For Discharges to [facility]:   · Not Applicable to this Patient - Not Applicable to this Patient    Planned Readmission: no    Discharge Statement:  I spent 45 minutes discharging the patient  This time was spent on the day of discharge  I had direct contact with the patient on the day of discharge   Greater than 50% of the total time was spent examining patient, answering all patient questions, arranging and discussing plan of care with patient as well as directly providing post-discharge instructions  Additional time then spent on discharge activities  Discharge Medications:  See after visit summary for reconciled discharge medications provided to patient and family  ** Please Note: This note has been constructed using a voice recognition system   **

## 2017-08-12 NOTE — H&P
History and Physical - [facility] Internal Medicine    Patient Information: Candis Kent 46 y o  male MRN: [id number]  Unit/Bed#: -01 Encounter: [id number]  Admitting Physician: Jennifer Bhat PA-C  PCP: Truong Gentile DO  Date of Admission:  08/11/17    Assessment/Plan:    Hospital Problem List:     Principal Problem:    Acute respiratory failure with hypoxia  Active Problems:    Bilateral pneumonia    Gastric cancer    Acute thromboembolic cerebrovascular accident (CVA)    Bilateral pleural effusion      Plan for the Primary Problem(s):  ·   Acute respiratory failure with hypoxia- present upon admission increase work of breathing, unstable vital signs , blood pressure 246/131, respiratory rate 46, oxygen saturation at 79% room air likely secondary to bilateral pneumonia and pleural effusions setting of metastatic gastric cancer to bone and new liver mets  · spoke with patient POA and family members at the bedside Fior Lora in regards to diagnostic tests results and prognosis to confirm of end-of-life care  Patient Matthew Julian wife stated patient was a level III DNR/DNI prior to admission and would like to transition to comfort care measures only  Patient's wife reiterated she did not want IV fluids, antibiotic therapy IV, parenteral nutrition  Offered hospice liaison to speak with family in the a m  for home hospice however reiterated patient may pass overnight  Patient POA understood and plan to make arrangements for end-of-life care    · Hospice consultation requested  · continue pain control  morphine 4 mg IV Q4, Ativan 1 mg PRN Q4    ·   Acute thromboembolic cerebrovascular accident (CVA)-right facial droop altered mental status unresponsive to speech or painful stimuli for recent/evolving infarcts of left frontal opercular region and insular cortex  · patient POA requested end-of-life care and no additional workup to be performed    ·  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- found on CTA chest currently undergoing chemotherapy treatment with 5  FU with leucovorin along with Herceptin   · follows Dr Selvin Gotti and Dr Elsa Brady outpatient  recently seen by Dr Selvin Gotti on 7/28/2017      VTE Prophylaxis: Patient is made comfort care measures only  / reason for no mechanical VTE prophylaxis Patient is made comfort care measures only   Code Status: level IV  comfort care measures Ralf with patient Magda Peñaloza wife at the bedside  POLST: POLST form is completed    Anticipated Length of Stay:  Patient will be admitted on an Inpatient basis with an anticipated length of stay of  Atleast 2 midnights  Justification for Hospital Stay: acute respiratory failure comfort care measures and hospice consult    Total Time for Visit, including Counseling / Coordination of Care: 1 hour  Greater than 50% of this total time spent on direct patient counseling and coordination of care  Chief Complaint:   unresponsive    History of Present Illness:    Lucero Rogers is a 46 y o  male past medical history of 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 early on 5  FU with leucovorin along with Herceptin who presents with acute respiratory failure and right-sided hemiplegia x yesterday morning  Patient is not alert and oriented to self, time or location in acute respiratory distress  The following sequence of events were obtained the patient Magda Peñaloza at the bedside who noticed a increase weakness of right facial muscle and lower extremity which started roughly around 2 AM last night  Patient continued to decline and was unable to sit up or get up from a seated position thus patient neighbor who was called to his home to assist recommended patient seek medical attention through the emergency department for further evaluation of right-sided weakness and aphasia      Of note, patient was recently seen by Dr Selvin Gotti on 7/28/2017 with extensive cancer history was scheduled to BP short interval imaging and echocardiogram within one month  Patient has been following with Dr Emi Fleming outpatient palliative care team     In the ED, patient was found to be tachycardic 246/131, respiratory rate 46,oxygen saturation at 79% on room air  Head CT noted findings suspicious for recent/evolving infarcts of left frontal opercular region and insular cortex  CTA chest PE no acute PE  evidence of pulmonary embolism, severe bilateral pulmonary consolidation likely pneumonia, bilateral pleural effusions, progressive adenopathy and upper abdomen with new ill-defined low densities in the liver likely liver metastasis, extensive bone metastasis  After thorough and elongated discussion with ED physician Dr Barbara Flores  Family STAR Herrera has decided to withdraw from all life-sustaining treatments and to assess patient for comfort care measures only for possible hospice consultation  Patient continue to remain unresponsive during interview  Review of Systems:    Review of Systems   Unable to perform ROS: Unstable vital signs       Past Medical and Surgical History:     Past Medical History:   Diagnosis Date    Alcohol use     Beer    Cancer associated pain     Constipation due to opioid therapy     Gastric cancer     GERD (gastroesophageal reflux disease)     Hernia, inguinal     Hx of radiation therapy     Opioid dependence, continuous     Osseous metastasis     Pulmonary embolus     T8 vertebral fracture        Past Surgical History:   Procedure Laterality Date    PORTACATH PLACEMENT Right        Meds/Allergies:    Prior to Admission medications    Medication Sig Start Date End Date Taking?  Authorizing Provider   docusate sodium (COLACE) 50 mg capsule Take by mouth 2 (two) times a day as needed for constipation   Yes Historical Provider, MD   morphine (MS CONTIN) 30 mg 12 hr tablet Take 30 mg by mouth daily     Yes Historical Provider, MD   morphine (MSIR) 15 mg tablet Take 15 mg by mouth every evening   Yes Historical Provider, MD   oxyCODONE (OXY-IR) 5 MG capsule Take 10 mg by mouth every 4 (four) hours as needed for moderate pain     Yes Historical Provider, MD   pantoprazole (PROTONIX) 40 mg tablet Take 40 mg by mouth daily  Yes Historical Provider, MD   rivaroxaban Suarez Omi) tablet Take 15 mg by mouth daily with breakfast     Yes Historical Provider, MD   senna (SENOKOT) 8 6 mg Take 2 tablets by mouth daily as needed for constipation 7/15/17  Yes Imelda Chao MD     I have reviewed home medications with patient family member  Allergies: No Known Allergies    Social History:     Marital Status: /Civil Union   Occupation: unemployed  Patient Pre-hospital Living Situation: lives with wife at home  Patient Pre-hospital Level of Mobility: ambulates with some difficulty using rolling walker  Patient Pre-hospital Diet Restrictions: none  Substance Use History:   History   Alcohol Use    Yes     Comment: occasional     History   Smoking Status    Current Every Day Smoker    Packs/day: 1 00    Years: 30 00    Types: Cigarettes   Smokeless Tobacco    Not on file     History   Drug Use No       Family History:    Family History   Problem Relation Age of Onset    Heart disease Mother        Physical Exam:     Vitals:   Blood Pressure: (!) 180/124 (08/11/17 1933)  Pulse: (!) 139 (08/11/17 2056)  Temperature: 99 4 °F (37 4 °C) (08/11/17 1656)  Temp Source: Rectal (08/11/17 1656)  Respirations: (!) 44 (08/11/17 1933)  Weight - Scale: 77 6 kg (171 lb 1 2 oz) (08/11/17 1611)  SpO2: (!) 69 % (08/11/17 2056)    Physical Exam   Constitutional:   Increases work of breathing, accessory muscle use, unresponsive to verbal or pain stimuli   HENT:   Dry mucous membranes,   Eyes:   Fixed pupils bilaterally   Cardiovascular: Regular rhythm      Tachycardia, DP pulses present bilaterally +3 bilateral lower extremity edema +2 home capillary refill bilateral lower extremity +3   Pulmonary/Chest: He is in respiratory distress  He has wheezes  He has rales  He exhibits no tenderness  accessory muscle use, wheezes and crackles   Abdominal:   Hypoactive bowel sounds, nontender abdomen nondistended   Musculoskeletal: He exhibits edema  Neurological:   Not alert to self, time or location   Skin:   Clammy, pale   Vitals reviewed  Additional Data:     Lab Results: I have personally reviewed pertinent reports  Results from last 7 days  Lab Units 08/11/17  1635 08/11/17  1630   WBC Thousand/uL  --  18 62*   HEMOGLOBIN g/dL  --  10 4*   I STAT HEMOGLOBIN g/dl 11 2*  --    HEMATOCRIT %  --  33 8*   PLATELETS Thousands/uL  --  41*   LYMPHO PCT %  --  3*   MONO PCT MAN %  --  1*   EOSINO PCT MANUAL %  --  0       Results from last 7 days  Lab Units 08/11/17  1635 08/11/17  1630   SODIUM mmol/L  --  138   POTASSIUM mmol/L  --  4 2   CHLORIDE mmol/L  --  101   CO2 mmol/L  --  19*   BUN mg/dL  --  20   CREATININE mg/dL  --  1 17   CALCIUM mg/dL  --  8 1*   TOTAL PROTEIN g/dL  --  5 8*   BILIRUBIN TOTAL mg/dL  --  0 70   ALK PHOS U/L  --  260*   ALT U/L  --  31   AST U/L  --  162*   GLUCOSE RANDOM mg/dL  --  87   GLUCOSE, ISTAT mg/dl 88  --        Results from last 7 days  Lab Units 08/11/17  1630   INR  1 85*       Imaging: I have personally reviewed pertinent films in PACS and I have personally reviewed pertinent films in PACS with a Radiologist     Xr Chest Pa & Lateral    Result Date: 8/2/2017  Narrative: CHEST - DUAL ENERGY INDICATION:  Shortness of breath and cough for 2 weeks  History of pneumonia  Stage IV stomach cancer  COMPARISON:  July 12, 2017 VIEWS:  PA (including soft tissue/bone algorithms) and lateral projections IMAGES:  4 FINDINGS:     The cardiomediastinal silhouette is stable  Left-sided infusion port catheter device is present and unchanged in position  There is moderate amount of infiltrate in the right lower lobe and more patchy appearing infiltrate in the right upper lobe    Compared with the prior examination the right-sided infiltrate seems stable or perhaps slightly improved  There is some residual infiltrate in the left lung as well although the left lung is noticeably improved from the prior study with much better aeration mostly in the upper lobe  There is no gross evidence of pleural fluid  No pneumothorax identified  Sclerotic foci are seen in the right glenoid and the left humeral head suspicious for metastatic disease in the bone  There is also mild compression deformity of T8 which appears sclerotic and this might be a pathologic compression fracture  This was seen on prior chest CT from July 14, 2017  Other sclerotic lesions which were visible on CT in the manubrium and perhaps ribs are not readily evident on this plain film exam      Impression: Improving aeration of the lungs compared with the prior plain film and CT study with noticeable improvement on the left and mild improvement on the right  Metastatic disease in the bones with compression fracture at T8 similar to the prior CT exam  No new abnormality appreciated  Workstation performed: [id number]     Ct Head Wo Contrast    Result Date: 8/11/2017  Narrative: CT BRAIN - WITHOUT CONTRAST INDICATION:  Right-sided dense stroke  Right-sided weakness  COMPARISON:  October 20, 2015 TECHNIQUE:  CT examination of the brain was performed  In addition to axial images, coronal reformatted images were created and submitted for interpretation  Radiation dose length product (DLP) for this visit:  4859 mGy-cm   This examination, like all CT scans performed in the [facility], was performed utilizing techniques to minimize radiation dose exposure, including the use of iterative reconstruction and automated exposure control  IMAGE QUALITY:  Diagnostic  FINDINGS:  PARENCHYMA:  There is loss of gray-white differentiation in the left frontal opercular region with involvement of the insular region suggesting a recent infarct  No hemorrhage identified  There is no parenchymal hemorrhage  VENTRICLES AND EXTRA-AXIAL SPACES:  Normal for patient's age  VISUALIZED ORBITS AND PARANASAL SINUSES:  Left greater than right skin with sinus disease  CALVARIUM AND EXTRACRANIAL SOFT TISSUES:   Normal      Impression: Findings suspicious for recent/evolving infarct involving the left frontal opercular region and insular cortex  ##cfslh I personally discussed this result with Jose Enrique Bolivar on 8/11/2017 6:02 PM  ## Workstation performed: [id number]     Ct Chest Wo Contrast    Result Date: 7/14/2017  Narrative: CT CHEST WITHOUT IV CONTRAST INDICATION:  Dyspnea and pneumonia  Gastric carcinoma  Metastatic disease to left shoulder and bilateral hips  Chemotherapy  Recent radiation therapy  COMPARISON: CT dated 5/15/2017 and recent chest x-ray dated 7/12/2027 TECHNIQUE: CT examination of the chest was performed without intravenous contrast   Reformatted images were created in axial, sagittal, and coronal planes  Radiation dose length product (DLP) for this visit:  430 mGy-cm   This examination, like all CT scans performed in the [facility], was performed utilizing techniques to minimize radiation dose exposure, including the use of iterative reconstruction and automated exposure control  FINDINGS: LUNGS:  Extensive patchy bilateral airspace opacities consistent with multilobar pneumonia  Opportunistic infections should also be considered in a patient on chemotherapy  No endobronchial lesion  No pneumothorax  Left chest port in satisfactory position  PLEURA:  Small bibasilar pleural effusions noted  HEART/GREAT VESSELS:  Unremarkable for patient's age  MEDIASTINUM AND RYAN:  Small mediastinal lymph nodes as before  CHEST WALL AND LOWER NECK:  Unremarkable  VISUALIZED STRUCTURES IN THE UPPER ABDOMEN:  Unremarkable  OSSEOUS STRUCTURES:  Sclerotic lesion within the manubrium likely reflecting metastatic disease    T8 sclerotic lesion with associated compression deformity best seen on 603/67 also consistent with metastatic disease  Left humeral lesion consistent with metastatic disease  Bilateral sclerotic rib lesions consistent with metastatic disease  Impression: 1  Extensive bilateral patchy airspace opacities consistent with multilobar pneumonia as provided in the clinical history  In a patient on chemotherapy, also consider opportunistic infection  2   Bibasilar pleural effusions noted  3   Osseous metastatic disease including a pathologic vertebral compression deformity at T8  Workstation performed: [id number]     Pe Study With Ct Abdomen And Pelvis With Contrast    Result Date: 8/11/2017  Narrative: CT PULMONARY ANGIOGRAM OF THE CHEST AND CT ABDOMEN AND PELVIS WITH INTRAVENOUS CONTRAST INDICATION:  Gastric cancer  Recent stroke  Tachypnea and hypoxia  Metastatic disease  Weakness  COMPARISON: Prior CTA chest August 7, 2017  Prior CT abdomen pelvis May 15, 2017  TECHNIQUE:  CT examination of the chest, abdomen and pelvis was performed  Thin section CT angiographic technique was used in the chest in order to evaluate for pulmonary embolus and coronal 3D MIP postprocessing was performed on the acquisition scanner  Reformatted images were created in axial, sagittal, and coronal planes  Radiation dose length product (DLP) for this visit:  951 mGy-cm   This examination, like all CT scans performed in the [facility], was performed utilizing techniques to minimize radiation dose exposure, including the use of iterative reconstruction and automated exposure control  IV Contrast:  100 mL of iohexol (OMNIPAQUE)     Enteric Contrast:  Enteric contrast was not administered  FINDINGS: CHEST PULMONARY ARTERIAL TREE:  No pulmonary embolus is seen  LUNGS:  There is extensive consolidative opacity identified in the right upper lobe, right lower lobe, and left upper lobe suspicious for multilobar pneumonia   No pneumothorax identified  PLEURA:  Small right larger than left bilateral pleural effusions  Right basal consolidation may represent compressive atelectasis  HEART/AORTA:  Unremarkable for patient's age  MEDIASTINUM AND RYAN:  Unremarkable  CHEST WALL AND LOWER NECK:       Normal  ABDOMEN LIVER/BILIARY TREE:  There are several ill-defined areas of low-density throughout the liver likely metastatic disease  These are new from the prior CT from May 20, 2017  GALLBLADDER:  No calcified gallstones  No pericholecystic inflammatory change  SPLEEN:  Unremarkable  PANCREAS:  Unremarkable  ADRENAL GLANDS: Bilateral adrenal thickening likely metastatic as this is a new finding since May 20, 2017  KIDNEYS/URETERS:  Unremarkable  No hydronephrosis  STOMACH AND BOWEL:  No bowel wall thickening or bowel dilatation  APPENDIX:  No findings to suggest appendicitis  ABDOMINOPELVIC CAVITY:  Innumerable upper abdominal lymph nodes are seen in the peripancreatic region and periaortic as well as aortocaval and portacaval locations  Findings compatible with metastatic disease  Findings of significantly progressed since the prior study  VESSELS:  Unremarkable for patient's age  PELVIS REPRODUCTIVE ORGANS:  Unremarkable for patient's age  URINARY BLADDER:  Unremarkable  ABDOMINAL WALL/INGUINAL REGIONS:  Unremarkable  OSSEOUS STRUCTURES:  Extensive metastatic disease identified with a T8 compression fracture again noted  Other vertebral prostatic lesions as well as sternal manubrial, bilateral pelvis, sacrum and bilateral femur  Impression: 1  No evidence of pulmonary embolism  2  Extensive bilateral pulmonary consolidation likely pneumonia  Bilateral pleural effusions are also noted  3   Progressive adenopathy in the upper abdomen with new ill-defined areas of low-density in the liver likely liver metastasis  4   Extensive bone metastasis      Workstation performed: [id number]     Cta Chest Pe Study    Result Date: 8/7/2017  Narrative: CTA - CHEST WITH IV CONTRAST - PULMONARY ANGIOGRAM INDICATION: "sob, metastatic gastric ca" COMPARISON: Two-view chest 8-17  CT chest 7/14/2017  TECHNIQUE: CTA examination of the chest was performed using angiographic technique according to a protocol specifically tailored to evaluate for pulmonary embolism  Reformatted images were created in axial, sagittal, and coronal planes  In addition, coronal 3D MIP postprocessing was performed on the acquisition scanner  Radiation dose length product (DLP) for this visit:  288 mGy-cm   This examination, like all CT scans performed in the [facility], was performed utilizing techniques to minimize radiation dose exposure, including the use of iterative reconstruction and automated exposure control  IV Contrast:  85 mL of iohexol (OMNIPAQUE)  350  FINDINGS: PULMONARY ARTERIAL TREE:  No pulmonary embolus is seen  LUNGS:  Extensive persistent bilateral airspace disease in keeping with multilobar pneumonia  No endotracheal or endobronchial lesion  PLEURA: Small bilateral pleural effusions slightly larger on the right  HEART/AORTA:  Stable  MEDIASTINUM AND RYAN:  Mild diffuse mediastinal adenopathy unchanged from the prior study  CHEST WALL AND LOWER NECK:  Left-sided chest port  VISUALIZED STRUCTURES IN THE UPPER ABDOMEN:  Partially imaged diffuse adrenal gland thickening in keeping with hyperplasia or metastases  OSSEOUS STRUCTURES:  Scattered stable osseous metastases most prominent within the manubrium  Stable compression deformity of T8  Impression: No pulmonary arterial embolism  Severe multilobar pneumonia relatively unchanged from the prior CT chest  Bilateral adrenal gland thickening in keeping with hyperplasia or metastatic disease  Stable osseous metastases with stable T8 compression deformity   Workstation performed: [id number]       EKG, Pathology, and Other Studies Reviewed on Admission:   · CTA chest PE  no acute PE  evidence of pulmonary embolism, severe bilateral pulmonary consolidation likely pneumonia, bilateral pleural effusions, progressive adenopathy and upper abdomen with new ill-defined low densities in the liver likely liver metastasis, extensive bone metastasis  · Head CT  findings suspicious for recent/evolving infarcts of left frontal opercular region and insular cortex    Allscripts / Epic Records Reviewed: Yes     ** Please Note: This note has been constructed using a voice recognition system   **

## 2017-08-12 NOTE — PROGRESS NOTES
[facility] Progress Note  Patient: Fatoumata Lopez 46 y o  male   MRN: [id number]  PCP: Emily Hilton DO  Unit/Bed#: -01 Encounter: [id number]  Date Of Visit: 17    Assessment:    Principal Problem:    Acute respiratory failure with hypoxia  Active Problems:    Bilateral pneumonia    Gastric cancer    Acute thromboembolic cerebrovascular accident (CVA)    Bilateral pleural effusion      Plan:    · acute hypoxic resp failure  · Bilateral multilobar PNA  · Acute CVA  · H/o gastric Ca with metastasis    As per the discussion with night PA, patient is level 4 comfort care  Discussed with wife & kids at bedside, the are awaiting to find out if he can go home as hospice  D/w CM - she will talk to the family    Continue IV morphine 4q4 & prn ativan      VTE Pharmacologic Prophylaxis:   Pharmacologic: comfort  Mechanical VTE Prophylaxis in Place: No    Patient Centered Rounds: I have performed bedside rounds with nursing staff today  Discussions with Specialists or Other Care Team Provider: Cm    Education and Discussions with Family / Patient: wife & family at bedisde    Time Spent for Care: 45 minutes  More than 50% of total time spent on counseling and coordination of care as described above  Current Length of Stay: 1 day(s)    Current Patient Status: Inpatient   Certification Statement: The patient will continue to require additional inpatient hospital stay due to working on hospice disposition    Discharge Plan / Estimated Discharge Date: based on hospice disposition    Code Status: Level 4 - Comfort Care      Subjective:   Patient not responsive, tachypneic, upper airway secretions    Objective:     Vitals:   Temp (24hrs), Av 4 °F (37 4 °C), Min:99 4 °F (37 4 °C), Max:99 4 °F (37 4 °C)    HR:  [126-142] 139  Resp:  [40-46] 44  BP: (108-246)/() 180/124  SpO2:  [61 %-98 %] 69 %  Body mass index is 24 9 kg/m²       Input and Output Summary (last 24 hours): Intake/Output Summary (Last 24 hours) at 08/12/17 0854  Last data filed at 08/12/17 0736   Gross per 24 hour   Intake             3000 ml   Output                1 ml   Net             2999 ml       Physical Exam:     Physical Exam   Constitutional: He appears well-developed and well-nourished  HENT:   Head: Normocephalic and atraumatic  Eyes: Pupils are equal, round, and reactive to light  Cardiovascular: Normal rate, regular rhythm and normal heart sounds  Pulmonary/Chest: Effort normal and breath sounds normal    Tachypnea, upper airway transmitted sounds   Abdominal: Soft  He exhibits no distension  There is no tenderness  Neurological:   unresponsive         Additional Data:     Labs:      Results from last 7 days  Lab Units 08/11/17  1635 08/11/17  1630   WBC Thousand/uL  --  18 62*   HEMOGLOBIN g/dL  --  10 4*   I STAT HEMOGLOBIN g/dl 11 2*  --    HEMATOCRIT %  --  33 8*   PLATELETS Thousands/uL  --  41*   LYMPHO PCT %  --  3*   MONO PCT MAN %  --  1*   EOSINO PCT MANUAL %  --  0       Results from last 7 days  Lab Units 08/11/17  1635 08/11/17  1630   SODIUM mmol/L  --  138   POTASSIUM mmol/L  --  4 2   CHLORIDE mmol/L  --  101   CO2 mmol/L  --  19*   BUN mg/dL  --  20   CREATININE mg/dL  --  1 17   CALCIUM mg/dL  --  8 1*   TOTAL PROTEIN g/dL  --  5 8*   BILIRUBIN TOTAL mg/dL  --  0 70   ALK PHOS U/L  --  260*   ALT U/L  --  31   AST U/L  --  162*   GLUCOSE RANDOM mg/dL  --  87   GLUCOSE, ISTAT mg/dl 88  --        Results from last 7 days  Lab Units 08/11/17  1630   INR  1 85*       * I Have Reviewed All Lab Data Listed Above  * Additional Pertinent Lab Tests Reviewed:  All Labs Within Last 24 Hours Reviewed    Imaging:    Imaging Reports Reviewed Today Include: CT scans  Imaging Personally Reviewed by Myself Includes:      Recent Cultures (last 7 days):           Last 24 Hours Medication List:     morphine injection 4 mg Intravenous Q4H   piperacillin-tazobactam 4 5 g Intravenous Once scopolamine 1 patch Transdermal Q72H        Today, Patient Was Seen By: Lianna Bartlett MD    ** Please Note: This note has been constructed using a voice recognition system   **

## 2017-08-12 NOTE — SOCIAL WORK
8/12/2017 5:09 PM (ET) Riaz Buckley: Hospice consult recvd met w pts spouse Nadia Billingsley who is requesting referral to [facility] but was adminant that she does not want her  to be moved from the hospital because she believed that he was actively dying

## 2017-08-14 DIAGNOSIS — C16.9 MALIGNANT NEOPLASM OF STOMACH (HCC): ICD-10-CM

## 2017-08-14 LAB
ATRIAL RATE: 72 BPM
QRS AXIS: 19 DEGREES
QRSD INTERVAL: 80 MS
QT INTERVAL: 288 MS
QTC INTERVAL: 426 MS
T WAVE AXIS: 35 DEGREES
VENTRICULAR RATE: 132 BPM

## 2017-08-14 NOTE — CASE MANAGEMENT
Initial Clinical Review    Admission: Date/Time/Statement: 8/11/17 @ 2022     Orders Placed This Encounter   Procedures    Inpatient Admission (expected length of stay for this patient is greater than two midnights)     Standing Status:   Standing     Number of Occurrences:   1     Order Specific Question:   Admitting Physician     Answer:   Jaime Moe [[id number]]     Order Specific Question:   Level of Care     Answer:   Med Surg [16]     Order Specific Question:   Estimated length of stay     Answer:   More than 2 Midnights     Order Specific Question:   Certification     Answer:   I certify that inpatient services are medically necessary for this patient for a duration of greater than two midnights  See H&P and MD Progress Notes for additional information about the patient's course of treatment  ED: Date/Time/Mode of Arrival:   ED Arrival Information     Expected Arrival Acuity Means of Arrival Escorted By Service Admission Type    - 8/11/2017 16:06 Immediate Ambulance [address]  [facility] General Medicine Emergency    Arrival Complaint    SHORTNESS OF BREATH          Chief Complaint:   Chief Complaint   Patient presents with    CVA/TIA-like Symptoms     Pt presents with R sided weakness, last known well time 0200, Pt wife states that she went to check on him at that time and noticed his R facial droop but patient did not want to come to the hospital       History of Illness: 46 y o  male past medical history of 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 early on 5  FU with leucovorin along with Herceptin who presents with acute respiratory failure and right-sided hemiplegia x yesterday morning  Patient is not alert and oriented to self, time or location in acute respiratory distress   The following sequence of events were obtained the patient Td Herrera at the bedside who noticed a increase weakness of right facial muscle and lower extremity which started roughly around 2 AM last night  Patient continued to decline and was unable to sit up or get up from a seated position thus patient neighbor who was called to his home to assist recommended patient seek medical attention through the emergency department for further evaluation of right-sided weakness and aphasia      Of note, patient was recently seen by Dr Roro Farfan on 7/28/2017 with extensive cancer history was scheduled to BP short interval imaging and echocardiogram within one month  Patient has been following with Dr Naila Jama outpatient palliative care team      In the ED, patient was found to be tachycardic 246/131, respiratory rate 46,oxygen saturation at 79% on room air  Head CT noted findings suspicious for recent/evolving infarcts of left frontal opercular region and insular cortex  CTA chest PE no acute PE  evidence of pulmonary embolism, severe bilateral pulmonary consolidation likely pneumonia, bilateral pleural effusions, progressive adenopathy and upper abdomen with new ill-defined low densities in the liver likely liver metastasis, extensive bone metastasis      After thorough and elongated discussion with ED physician Dr Anthony Sim  Family STAR Mehta has decided to withdraw from all life-sustaining treatments and to assess patient for comfort care measures only for possible hospice consultation   Patient continue to remain unresponsive during interview        ED Vital Signs:   ED Triage Vitals   Temperature Pulse Respirations Blood Pressure SpO2   08/11/17 1656 08/11/17 1611 08/11/17 1611 08/11/17 1611 08/11/17 1611   99 4 °F (37 4 °C) (!) 133 (!) 40 123/81 (!) 79 %      Temp Source Heart Rate Source Patient Position BP Location FiO2 (%)   08/11/17 1656 08/11/17 1611 08/11/17 1611 08/11/17 1611 --   Rectal Monitor Sitting Right arm       Pain Score       08/11/17 1611       No Pain        Wt Readings from Last 1 Encounters:   08/11/17 77 6 kg (171 lb 1 2 oz)       Vital Signs (abnormal): in ED:    Date and Time Temp Pulse SpO2 Resp BP Pain Score FACES Pain Rating User   08/11/17 2056 --  139  69 % -- -- No Pain -- GLF   08/11/17 1933 --  138  62 %  44  180/124 No Pain -- GLF   08/11/17 1902 --  142  61 % -- -- No Pain -- GLF   08/11/17 1818 -- -- 98 % -- -- -- -- SDP   08/11/17 1814 --  126 97 %  46  246/131 No Pain -- GLF   08/11/17 1656 99 4 °F (37 4 °C) -- -- -- -- -- -- GLF   08/11/17 1622 --  133  88 %  40 108/72 No Pain -- GLF   08/11/17 1611 --  133  79 %  40 123/81          Abnormal Labs/Diagnostic Test Results:   Wbc 18 62, hgb 10 4, hct 33 8/  Platelets 41  Lactic acid 3 8  NT-proBNP 2725  Co2-19  Anion gap 18  Calcium 8 1   ast 162  Alkaline phosphatase 260  Total protein 5 8  Albumin 2 3  INR 1 85    PE study with ct abdomen - No evidence of pulmonary embolism  2  Extensive bilateral pulmonary consolidation likely pneumonia  Bilateral pleural effusions are also noted  3   Progressive adenopathy in the upper abdomen with new ill-defined areas of low-density in the liver likely liver metastasis  4   Extensive bone metastasis  CT head - Findings suspicious for recent/evolving infarct involving the left frontal opercular region and insular cortex  ED Treatment: blood cultures     Oxygen bipap to high flow nasal cannula  Medication Administration from 08/11/2017 1606 to 08/11/2017 2124       Date/Time Order Dose Route Action Action by Comments     08/11/2017 1755 sodium chloride 0 9 % bolus 1,000 mL 0 mL Intravenous Stopped Rob Marie RN      08/11/2017 1638 sodium chloride 0 9 % bolus 1,000 mL 1,000 mL Intravenous [address] Rob Marie RN      08/11/2017 1816 vancomycin (VANCOCIN) 1,250 mg in sodium chloride 0 9 % 250 mL IVPB 0 mg/kg Intravenous Stopped Rob Marie RN      08/11/2017 1753 vancomycin (VANCOCIN) 1,250 mg in sodium chloride 0 9 % 250 mL IVPB 1,250 mg Intravenous [address] Rob Marie RN      08/11/2017 9122 piperacillin-tazobactam (ZOSYN) 4 5 g in sodium chloride 0 9 % 100 mL IVPB 0 g Intravenous Hold Stevie Mata RN Held d/t other care orders a priority     08/11/2017 1816 sodium chloride 0 9 % bolus 1,000 mL 0 mL Intravenous Stopped Stevie Mata RN      08/11/2017 1755 sodium chloride 0 9 % bolus 1,000 mL 1,000 mL Intravenous [address] Stevie Mata RN      08/11/2017 1736 iohexol (OMNIPAQUE) 350 MG/ML injection (SINGLE-DOSE) 100 mL 100 mL Intravenous Given [employer]       08/11/2017 1808 morphine (PF) 4 mg/mL injection **AcuDose Override Pull** 8 mg Intravenous Given Stevie Mata RN      08/11/2017 2106 HYDROmorphone (DILAUDID) 2 mg/mL injection **AcuDose Override Pull**    Not Given Stevie Mata RN Not needed     08/11/2017 1817 ondansetron (ZOFRAN) injection 4 mg 4 mg Intravenous Given Stevie Mata RN      08/11/2017 1815 furosemide (LASIX) injection 40 mg 40 mg Intravenous Given Stevie Mata RN      08/11/2017 1812 LORazepam (ATIVAN) 2 mg/mL injection 2 mg 2 mg Intravenous Given Stevie Mata RN      08/11/2017 1811 HYDROmorphone (DILAUDID) 2 mg/mL injection 2 mg 2 mg Intravenous Given Stevie Mata RN      08/11/2017 1808 morphine (PF) 10 mg/mL injection 8 mg 8 mg Intravenous Given Stevie Mata RN 8mg given in override pull by LACHELLE Brothers RN, order for 8mg placed by Dr Bonnie Balderas unable to be linked to override pull because Morphine 4mg x2 pulled but qued to pull 10mg vial for administration   this should be linked to 1808 Morphine 8mg dose!!!     08/11/2017 1806 LORazepam (ATIVAN) 2 mg/mL injection 1 mg 1 mg Intravenous Given Stevie Mata RN      08/11/2017 1805 morphine (PF) 4 mg/mL injection 4 mg 4 mg Intravenous Given Stevie Mata RN      08/11/2017 1754 LORazepam (ATIVAN) 2 mg/mL injection 0 5 mg 0 5 mg Intravenous Given Stevie Mata RN      08/11/2017 1754 morphine (PF) 4 mg/mL injection 4 mg 4 mg Intravenous Given Stevie Mata RN           Past Medical/Surgical History: Active Ambulatory Problems     Diagnosis Date Noted    Acute respiratory failure with hypoxia 07/10/2017    Bilateral pneumonia 07/10/2017    Gastric cancer 07/10/2017    GERD (gastroesophageal reflux disease) 07/10/2017    Metastasis to spinal column 07/10/2017    NSTEMI (non-ST elevated myocardial infarction) 07/13/2017    Chronic pain 07/13/2017    Chronic, continuous use of opioids 07/13/2017    History of pulmonary embolus (PE) 07/13/2017    Anemia 07/13/2017     Resolved Ambulatory Problems     Diagnosis Date Noted    Lactic acidosis 07/10/2017    Elevated troponin 07/10/2017    Hyponatremia 07/10/2017    Hypokalemia 07/13/2017     Past Medical History:   Diagnosis Date    Alcohol use     Cancer associated pain     Constipation due to opioid therapy     Gastric cancer     GERD (gastroesophageal reflux disease)     Hernia, inguinal     Hx of radiation therapy     Opioid dependence, continuous     Osseous metastasis     Pulmonary embolus     T8 vertebral fracture        Admitting Diagnosis: Shortness of breath [R06 02]  Respiratory failure [J96 90]  Pneumonia [J18 9]    Age/Sex: 46 y o  male  Assessment/Plan:  Acute respiratory failure with hypoxia- present upon admission increase work of breathing, unstable vital signs , blood pressure 246/131, respiratory rate 46, oxygen saturation at 79% room air likely secondary to bilateral pneumonia and pleural effusions setting of metastatic gastric cancer to bone and new liver mets  ? spoke with patient POA and family members at the bedside Stephanie Ann in regards to diagnostic tests results and prognosis to confirm of end-of-life care  Patient Emma Wilson wife stated patient was a level III DNR/DNI prior to admission and would like to transition to comfort care measures only  Patient's wife reiterated she did not want IV fluids, antibiotic therapy IV, parenteral nutrition   Offered hospice liaison to speak with family in the a m  for home hospice however reiterated patient may pass overnight  Patient POA understood and plan to make arrangements for end-of-life care  ? Hospice consultation requested  ? continue pain control  morphine 4 mg IV Q4, Ativan 1 mg PRN Q4     ·   Acute thromboembolic cerebrovascular accident (CVA)-right facial droop altered mental status unresponsive to speech or painful stimuli for recent/evolving infarcts of left frontal opercular region and insular cortex  ? patient POA requested end-of-life care and no additional workup to be performed     ·  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- found on CTA chest currently undergoing chemotherapy treatment with 5  FU with leucovorin along with Herceptin   ? follows Dr Susu Leon and Dr Jethro Huang outpatient  recently seen by Dr Susu Leon on 7/28/2017       Admission Orders:  8/11/2017 2022 INPATIENT   Scheduled Meds: morphine 4 mg iv  Continuous Infusions:   No current facility-administered medications for this encounter  PRN Meds:     OTHER ORDERS:  Level 4 comfort care  [address] [place] [address] in the [facility] by Moris Ledezma for 2017  Network Utilization Review Department  Phone: [phone]; Fax [phone]  ATTENTION: The Network Utilization Review Department is now centralized for our 7 Facilities  Make a note that we have a new phone and fax numbers for our Department  Please call with any questions or concerns to [phone] and carefully follow the prompts so that you are directed to the right person  All voicemails are confidential  Fax any determinations, approvals, denials, and requests for initial or continue stay review clinical to [phone]  **Due to HIGH CALL volume, it would be easier if you could please send faxed requests  This will expedite your requests and in part, help us provide discharge notifications faster   **

## 2017-08-16 LAB
BACTERIA BLD CULT: NORMAL
BACTERIA BLD CULT: NORMAL

## 2017-09-25 DIAGNOSIS — C16.9 MALIGNANT NEOPLASM OF STOMACH (HCC): ICD-10-CM

## 2018-01-10 NOTE — PROGRESS NOTES
Assessment    1  Multiple lesions of metastatic malignancy (199 1) (C79 9)   2  Gastric cancer (151 9) (C16 9)    Plan  Gastric cancer    · Follow-up visit in 1 month Evaluation and Treatment  Follow-up  Status: Hold For -  Scheduling  Requested for: [id number]   Ordered; For: Gastric cancer; Ordered By: Claudio Aggarwal Performed:  Due: [id number]    Discussion/Summary  Discussion Summary: This is a pleasant 20-year-old male with what seems to be metastatic adenocarcinoma of the stomach  He finished palliative radiation for pain control and was started him on modified FOLFOX 6 10/06/15  Herceptin was added on for dose #3  This is because his tumor did test positive for HER-2  Jasmeet Bañuelos indicated because of his bony metastatic disease q 4 weeks  Oxaliplatin was discontinued after 7 cycles in January 2016  His ejection fraction was 60% on echo 11/9/16; 55% 2/2/17  His imaging 1/16/17 showed stable disease  There was no evidence of progression  The patient stated that he wished to take a break from the 5-FU and leucovorin and so was on Herceptin alone  He then had progression  5-FU leucovorin along with his Herceptin restarted 3/28/2017  CT C/A/P 5/10/2017 showed improvement in his disease  He med with Dr Qian Chaudhary and completed radiation therapy to his left shoulder and hips  Treatment will resume 6/27/2017  Goals and Barriers: The patient has the current Goals: Prolong survival from gastric cancer  The patent has the current Barriers: None  Patient's Capacity to Self-Care: Patient is able to Self-Care  Medication SE Review and Pt Understands Tx: The treatment plan was reviewed with the patient/guardian  The patient/guardian understands and agrees with the treatment plan      Chief Complaint  Chief Complaint: Chief Complaint:   The patient presents to the office today with Stage IV gastric cancer        History of Present Illness  Previous Therapy:   Workup  Palliative radiation to the right scapula and T6- T10  5FU/ LV + Herceptin  mFOLFOX6 #1 was 10/6/15  Herceptin was added on for dose #3  Oxaliplatin was dropped in January 2016  5FU and Leucovorin discontinued at patient request after CT scan 1/16/17 showed stable disease  Dose 34 1/17/17  Herceptin every 2 weeks January 31 to March 13th 2017       Current Therapy: 5-FU leucovorin and Herceptin restarted on March 28  Dose #44 6/6/2017  and Xgeva every 4 weeks  Treatment on hiatus for RT to Left shoulder B hips  Dose #45 to resume 6/27/2017       Interval History: Out of pain meds since yesterday  Was stretching out x 4 days before  Otherwise, feels well  Review of Systems  Complete-Male:  As stated in the history of present illness otherwise his 14 point review of systems today was negative  Constitutional: No fever or chills, feels well, no tiredness, no recent weight gain or weight loss  Eyes: No complaints of eye pain, no red eyes, no discharge from eyes, no itchy eyes  ENT: no complaints of earache, no hearing loss, no nosebleeds, no nasal discharge, no sore throat, no hoarseness  Cardiovascular: No complaints of slow heart rate, no fast heart rate, no chest pain, no palpitations, no leg claudication, no lower extremity  Respiratory: No complaints of shortness of breath, no wheezing, no cough, no SOB on exertion, no orthopnea or PND  Gastrointestinal: No complaints of abdominal pain, no constipation, no nausea or vomiting, no diarrhea or bloody stools  Genitourinary: No complaints of dysuria, no incontinence, no hesitancy, no nocturia, no genital lesion, no testicular pain  Musculoskeletal: arthralgias, limb pain, myalgias and joint stiffness, but as noted in HPI  Integumentary: No complaints of skin rash or skin lesions, no itching, no skin wound, no dry skin  Neurological: No compliants of headache, no confusion, no convulsions, no numbness or tingling, no dizziness or fainting, no limb weakness, no difficulty walking  Psychiatric: Is not suicidal, no sleep disturbances, no anxiety or depression, no change in personality, no emotional problems  Endocrine: No complaints of proptosis, no hot flashes, no muscle weakness, no erectile dysfunction, no deepening of the voice, no feelings of weakness  Hematologic/Lymphatic: No complaints of swollen glands, no swollen glands in the neck, does not bleed easily, no easy bruising  Active Problems    1  Abnormal weight loss (783 21) (R63 4)   2  Advanced care planning/counseling discussion (V65 49) (Z71 89)   3  Alcohol use (V49 89) (Z78 9)   4  Antineoplastic drugs causing adverse effect (E933 1) (T45 1X5A)   5  Cancer related pain (338 3) (G89 3)   6  Cervical pain (723 1) (M54 2)   7  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,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) (I42 7,T45  1X5A)   8  Closed fracture of body of thoracic vertebra (805 2) (S22 009A)   9  Closed wedge compression fracture of eighth thoracic vertebra, initial encounter (805 2)   (S22 060A)   10  Constipation (564 00) (K59 00)   11  Dysphagia (787 20) (R13 10)   12  Encounter for screening colonoscopy (V76 51) (Z12 11)   13  Gastric cancer (151 9) (C16 9)   14  Gastro-esophageal reflux (530 81) (K21 9)   15  H/O kyphoplasty (V45 89) (Z98 890)   16  Headache (784 0) (R51)   17  Left shoulder pain (719 41) (M25 512)   18  Mediastinal adenopathy (785 6) (R59 0)   19  Multiple lesions of metastatic malignancy (199 1) (C79 9)   20  Non-traumatic compression fracture of T8 thoracic vertebra (733 13) (M48 54XA)   21  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) (C79 51)   22  Other complication due to venous access device (996 74) (T82 898A)   23  Under care of palliative care specialist (V66 7) (Z51 5)    Past Medical History    1  History of Atypical chest pain (786 59) (R07 89)   2  History of candidiasis of mouth (V12 09) (Z86 19)   3  History of nausea and vomiting (V12 79) (I11 117)    Surgical History    1  History of Knee Surgery   2   History of Percutaneous Portal Vein Catheter Placement  Surgical History Reviewed: The surgical history was reviewed and updated today  Family History  Mother    1  Family history of cardiac disorder (V17 49) (Z82 49)  Family History Reviewed: The family history was reviewed and updated today  Social History    · Alcohol use (V49 89) (Z78 9)   · Coffee   · Current every day smoker (305 1) (F17 200)   · Daily alcohol use   ·    · No drug use   · Occupation   · Two children  Social History Reviewed: The social history was reviewed and updated today  Current Meds   1  Calcium + D TABS; take 2 tablet daily; Therapy: (Recorded:06Jun2017) to Recorded   2  Dexamethasone 4 MG Oral Tablet; Take 1 tablet daily with breakfast   May wean down to   1/2 tab if tolerated; Therapy: 00JNM3071 to (Last Rx:16Jun2017)  Requested for: 79RQH6479 Ordered   3  Lactulose 20 GM/30ML Oral Solution; 30ml by mouth up to 4 times a day for constipation   as needed; Therapy: 05BKS2276 to (Yumiko Julian)  Requested for: 86Ngz8883; Last   Rx:05Avq2095 Ordered   4  Morphine Sulfate ER 15 MG Oral Tablet Extended Release; 1 tablet in the evening; Therapy: 28Lvy4257 to (Evaluate:23Jun2017); Last Rx:16Jun2017 Ordered   5  Morphine Sulfate ER 30 MG Oral Tablet Extended Release; 1 tablet daily in the morning; Therapy: 20OTT7793 to (Evaluate:23Jun2017); Last Rx:16Jun2017 Ordered   6  OxyCODONE HCl - 10 MG Oral Tablet; TAKE 1 TABLET EVERY 4 HOURS AS NEEDED   FOR BREAKTHROUGH PAIN;   Therapy: 43SPU6204 to (Last Rx:16Jun2017) Ordered   7  Protonix 40 MG Oral Tablet Delayed Release; TAKE 1 TABLET DAILY; Therapy: 65ARX4174 to (Emmanuel Fernando) Recorded   8  Xarelto 20 MG Oral Tablet; take 1 tablet daily with food; Therapy: 60XGK3314 to (Evaluate:57Phx3834) Recorded  Medication List Reviewed: The medication list was reviewed and updated today  Allergies    1   No Known Drug Allergies    Vitals  Vital Signs    Recorded: 21Jun2017 02: 15PM   Temperature 97 4 F   Heart Rate 98   Respiration 18   Systolic 229   Diastolic 88   Height 5 ft 8 in   Weight 193 lb 6 08 oz   BMI Calculated 29 4   BSA Calculated 2 02   O2 Saturation 97     Physical Exam    Constitutional   General appearance: No acute distress, well appearing and well nourished  Eyes   Conjunctiva and lids: No swelling, erythema, or discharge  Pupils and irises: Equal, round and reactive to light  Ears, Nose, Mouth, and Throat   External inspection of ears and nose: Normal     Pulmonary   Respiratory effort: No increased work of breathing or signs of respiratory distress  Auscultation of lungs: Clear to auscultation, equal breath sounds bilaterally, no wheezes, no rales, no rhonci  Cardiovascular   Palpation of heart: Normal PMI, no thrills  Auscultation of heart: Normal rate and rhythm, normal S1 and S2, without murmurs  Examination of extremities for edema and/or varicosities: Normal     Abdomen   Abdomen: Non-tender, no masses  Liver and spleen: No hepatomegaly or splenomegaly  Lymphatic   Palpation of lymph nodes in neck: No lymphadenopathy  Musculoskeletal   Gait and station: Normal     Skin   Skin and subcutaneous tissue: Normal without rashes or lesions  Neurologic   Sensation: No sensory loss  Psychiatric   Orientation to person, place and time: Normal     Mood and affect: Normal     Additional Exam:  left 4/5 shoulder strength; LROM left shoulder  ECOG Zero       Future Appointments    Date/Time Provider Specialty Site   07/28/2017 02:15 PM VIMAL Muñoz  Hematology Oncology [facility] [place]   06/28/2017 03:20 PM VIMAL [place]   [address]     Signatures   Electronically signed by : Eunice Martin, [facility]; Jun 22 2017  4:53PM EST                       (Author)

## 2018-01-11 NOTE — PROGRESS NOTES
Discussion/Summary  Social Work-Discussion Summary  ke: Patient is being seen for a distress screenning assessment  Received and reviewed Pt's Distress Thermometer completed by Pt on 6/8/17 in the [place] [address] office  Pt rated his distress at a 2/10 and denied having any psychosocial problems  Based upon Pt's low distress score, a social work follow up is not indicated  If Pt expresses psychosocial needs, Cancer Counselor is available to provide counseling and intervention         Signatures   Electronically signed by : Tracy Myrick, [address]; Jun 30 2017 10:30AM EST                       (Author)

## 2018-01-12 VITALS
OXYGEN SATURATION: 98 % | BODY MASS INDEX: 31.52 KG/M2 | SYSTOLIC BLOOD PRESSURE: 142 MMHG | RESPIRATION RATE: 18 BRPM | DIASTOLIC BLOOD PRESSURE: 100 MMHG | TEMPERATURE: 97.3 F | HEIGHT: 68 IN | HEART RATE: 96 BPM | WEIGHT: 208 LBS

## 2018-01-12 VITALS
DIASTOLIC BLOOD PRESSURE: 78 MMHG | HEIGHT: 68 IN | RESPIRATION RATE: 18 BRPM | BODY MASS INDEX: 31.07 KG/M2 | OXYGEN SATURATION: 99 % | HEART RATE: 88 BPM | WEIGHT: 205 LBS | SYSTOLIC BLOOD PRESSURE: 110 MMHG | TEMPERATURE: 98.8 F

## 2018-01-12 VITALS
SYSTOLIC BLOOD PRESSURE: 128 MMHG | HEART RATE: 98 BPM | RESPIRATION RATE: 18 BRPM | DIASTOLIC BLOOD PRESSURE: 88 MMHG | BODY MASS INDEX: 29.31 KG/M2 | OXYGEN SATURATION: 97 % | TEMPERATURE: 97.4 F | WEIGHT: 193.38 LBS | HEIGHT: 68 IN

## 2018-01-12 VITALS
HEIGHT: 68 IN | BODY MASS INDEX: 31.52 KG/M2 | RESPIRATION RATE: 18 BRPM | DIASTOLIC BLOOD PRESSURE: 86 MMHG | TEMPERATURE: 97.7 F | HEART RATE: 98 BPM | SYSTOLIC BLOOD PRESSURE: 120 MMHG | WEIGHT: 208 LBS | OXYGEN SATURATION: 99 %

## 2018-01-13 VITALS
OXYGEN SATURATION: 99 % | TEMPERATURE: 97.5 F | WEIGHT: 205 LBS | HEART RATE: 103 BPM | DIASTOLIC BLOOD PRESSURE: 92 MMHG | RESPIRATION RATE: 18 BRPM | BODY MASS INDEX: 31.07 KG/M2 | HEIGHT: 68 IN | SYSTOLIC BLOOD PRESSURE: 132 MMHG

## 2018-01-13 VITALS
SYSTOLIC BLOOD PRESSURE: 120 MMHG | BODY MASS INDEX: 30.16 KG/M2 | OXYGEN SATURATION: 97 % | HEIGHT: 68 IN | WEIGHT: 199 LBS | RESPIRATION RATE: 18 BRPM | DIASTOLIC BLOOD PRESSURE: 78 MMHG | TEMPERATURE: 98.8 F | HEART RATE: 96 BPM

## 2018-01-13 VITALS
SYSTOLIC BLOOD PRESSURE: 102 MMHG | OXYGEN SATURATION: 87 % | WEIGHT: 178.13 LBS | DIASTOLIC BLOOD PRESSURE: 60 MMHG | RESPIRATION RATE: 24 BRPM | TEMPERATURE: 97.6 F | HEIGHT: 68 IN | BODY MASS INDEX: 27 KG/M2 | HEART RATE: 130 BPM

## 2018-01-13 VITALS
HEIGHT: 68 IN | TEMPERATURE: 96.9 F | BODY MASS INDEX: 27.28 KG/M2 | DIASTOLIC BLOOD PRESSURE: 60 MMHG | OXYGEN SATURATION: 91 % | RESPIRATION RATE: 20 BRPM | HEART RATE: 46 BPM | SYSTOLIC BLOOD PRESSURE: 98 MMHG | WEIGHT: 180 LBS

## 2018-01-13 VITALS
OXYGEN SATURATION: 98 % | HEIGHT: 68 IN | BODY MASS INDEX: 29.55 KG/M2 | WEIGHT: 195 LBS | HEART RATE: 93 BPM | TEMPERATURE: 98.6 F | DIASTOLIC BLOOD PRESSURE: 90 MMHG | SYSTOLIC BLOOD PRESSURE: 130 MMHG | RESPIRATION RATE: 16 BRPM

## 2018-01-13 VITALS
SYSTOLIC BLOOD PRESSURE: 130 MMHG | DIASTOLIC BLOOD PRESSURE: 80 MMHG | HEART RATE: 108 BPM | OXYGEN SATURATION: 97 % | TEMPERATURE: 96.8 F | RESPIRATION RATE: 18 BRPM | HEIGHT: 68 IN | BODY MASS INDEX: 30.16 KG/M2 | WEIGHT: 199 LBS

## 2018-01-13 VITALS
RESPIRATION RATE: 18 BRPM | BODY MASS INDEX: 31.12 KG/M2 | DIASTOLIC BLOOD PRESSURE: 82 MMHG | HEART RATE: 105 BPM | HEIGHT: 68 IN | WEIGHT: 205.31 LBS | TEMPERATURE: 97.8 F | SYSTOLIC BLOOD PRESSURE: 124 MMHG | OXYGEN SATURATION: 98 %

## 2018-01-14 VITALS
BODY MASS INDEX: 29.75 KG/M2 | TEMPERATURE: 98.6 F | SYSTOLIC BLOOD PRESSURE: 124 MMHG | DIASTOLIC BLOOD PRESSURE: 80 MMHG | OXYGEN SATURATION: 93 % | WEIGHT: 196.31 LBS | RESPIRATION RATE: 18 BRPM | HEART RATE: 96 BPM | HEIGHT: 68 IN

## 2018-01-14 VITALS
DIASTOLIC BLOOD PRESSURE: 78 MMHG | WEIGHT: 209.5 LBS | RESPIRATION RATE: 20 BRPM | BODY MASS INDEX: 31.75 KG/M2 | OXYGEN SATURATION: 98 % | SYSTOLIC BLOOD PRESSURE: 112 MMHG | TEMPERATURE: 98.3 F | HEART RATE: 103 BPM | HEIGHT: 68 IN

## 2018-01-14 VITALS
BODY MASS INDEX: 29.46 KG/M2 | HEART RATE: 85 BPM | OXYGEN SATURATION: 96 % | SYSTOLIC BLOOD PRESSURE: 120 MMHG | RESPIRATION RATE: 18 BRPM | WEIGHT: 194.4 LBS | TEMPERATURE: 97.4 F | HEIGHT: 68 IN | DIASTOLIC BLOOD PRESSURE: 70 MMHG

## 2018-01-14 NOTE — MISCELLANEOUS
Message   Recorded as Task   Date: 01/19/2016 04:22 PM, Created By: Joanne Serrato   Task Name: Med Renewal Request   Assigned To: Joanne Serrato   Regarding Patient: Tati Cabezas, Status: Active   Comment:    Kandy Fonseca - 19 Jan 2016 4:22 PM     TASK CREATED  Patient came to cancer center at [facility] reporting that he was not going to have enough oxycodone to last him until his next appt on 2/9  He did not take a script for this at this last visit  Will leave printed script at   Plan  Cancer related pain, Osseous metastasis    · OxyCODONE HCl - 5 MG Oral Tablet; TAKE 1 TABLET EVERY 4 HOURS AS  NEEDED FOR PAIN    Signatures   Electronically signed by :  VIMAL Maciel ; Jan 19 2016  4:24PM EST                       (Author)

## 2018-01-15 VITALS
BODY MASS INDEX: 29.55 KG/M2 | TEMPERATURE: 98.4 F | SYSTOLIC BLOOD PRESSURE: 120 MMHG | RESPIRATION RATE: 18 BRPM | DIASTOLIC BLOOD PRESSURE: 78 MMHG | HEART RATE: 120 BPM | OXYGEN SATURATION: 97 % | HEIGHT: 68 IN | WEIGHT: 195 LBS

## 2018-01-16 NOTE — PROGRESS NOTES
Discussion/Summary  Social Work-Discussion Summary [facility]: Patient is being seen for a distress screenning assessment  LSW reviewed pt's distress thermometer completed by pt on 6/8/2017 in rad onc  Pt rated their distress a 2/10 and denied psychosocial problems  Based on pt's score, a social work follow up would not be indicated  LSW previously introduced her role to pt on 11/10/2015  If pt expresses psychosocial needs, LSW is available to provide cancer care counseling        Signatures   Electronically signed by : MYKE Bowman; Jun 12 2017  9:01AM EST                       (Author)

## 2018-01-17 NOTE — PROGRESS NOTES
Assessment    1  Gastric cancer (151 9) (C16 9)    Plan  Gastric cancer    · (1) CBC/PLT/DIFF; Status:Active; Requested for:11Mar2016;    Perform:[facility]; KYS:[id number]; For:Gastric cancer; Ordered By:Everton Talley;   · (1) CBC/PLT/DIFF; Status: In Progress - Order Generated; Requested for:Recurring  Schedule: 3/11/2016; 3/25/2016; 4/8/2016; 4/22/2016; 5/6/2016; 5/20/2016; 6/3/2016;  6/17/2   ; Perform:[facility]; PHA:[id number]; For:Gastric cancer; Ordered By:Everton Talley;   · (1) COMPREHENSIVE METABOLIC PANEL; Status:Active; Requested for:11Mar2016;    Perform:[facility]; YVN:[id number]; For:Gastric cancer; Ordered By:Everton Talley;   · (1) COMPREHENSIVE METABOLIC PANEL; Status: In Progress - Order Generated; Requested for:Recurring Schedule: 3/11/2016; 3/25/2016; 4/8/2016; 4/22/2016; 5/6/2016;  5/20/2016; 6/3/2016; 6/17/2   ; Perform:[facility]; IVY:[id number]; For:Gastric cancer; Ordered By:Everton Talley;   · * CT CHEST ABDOMEN PELVIS W CONTRAST; Status:Need Information - Financial  Authorization; Requested for:[id number];    Perform:[facility]; Due:12Jnm3108;Ordered; For:Gastric cancer; Ordered By:Everton Talley;   · Follow-up visit in 1 month Evaluation and Treatment  Follow-up  Status: Hold For -  Scheduling  Requested for: [id number]   Ordered; For: Gastric cancer; Ordered By: Grace Magana Performed:  Due: 80FBQ1151    Discussion/Summary  Discussion Summary: This is a pleasant 57-year-old male with what seems to be metastatic adenocarcinoma of the stomach  He finished palliative radiation for pain control and was started him on modified FOLFOX 6 10/06/15  Herceptin was added on for dose #3  This is because his tumor did test positive for HER-2  Randolm Petite indicated because of his bony metastatic disease q 4 weeks  Oxaliplatin was discontinued after 7 cycles  His ejection fraction was 55% via echo 10/23/15 and 65% 2/8/16   Clinically, he is doing well  Will continue with current treatment at this time  His most recent scan from January was stable  I will rescan him and see him a month  We will do his echocardiogram in May 2016  Medication SE Review and Pt Understands Tx: The treatment plan was reviewed with the patient/guardian  The patient/guardian understands and agrees with the treatment plan       Counseling Documentation With Imm: The patient, patient's family was counseled regarding diagnostic results, instructions for management, patient and family education  wife      Chief Complaint  Chief Complaint Free Text Note Form: Metastatic gastric cancer      History of Present Illness  Previous Therapy:   Workup    Palliative radiation to the right scapula and T6- T10       Current Therapy: Modified FOLFOX 6  He has received 7 doses so far  #1 was 10/6/15  Herceptin was added on for dose #3  Toes #11 was on March 1  Toes #12 we'll be on March 15  Interval History: The patient returns for follow-up visit  He states he is doing well  He has intermittent epigastric discomfort but overall feeling well  His next scan is due in April and I will order this  Overall his symptoms have been stable  He does have a slight rash around his port with no signs of infection  I suspect this is from the tape he applied to it with his last treatment  [facility] He is eating well and really has no other complaints today  Review of Systems  Complete-Male:  As stated in the history of present illness otherwise his 14 point review of systems today was negative  Constitutional: No fever or chills, feels well, no tiredness, no recent weight gain or weight loss  Eyes: No complaints of eye pain, no red eyes, no discharge from eyes, no itchy eyes  ENT: no complaints of earache, no hearing loss, no nosebleeds, no nasal discharge, no sore throat, no hoarseness     Cardiovascular: No complaints of slow heart rate, no fast heart rate, no chest pain, no palpitations, no leg claudication, no lower extremity  Respiratory: No complaints of shortness of breath, no wheezing, no cough, no SOB on exertion, no orthopnea or PND  Gastrointestinal: No complaints of abdominal pain, no constipation, no nausea or vomiting, no diarrhea or bloody stools  Genitourinary: No complaints of dysuria, no incontinence, no hesitancy, no nocturia, no genital lesion, no testicular pain  Musculoskeletal: No complaints of arthralgia, no myalgias, no joint swelling or stiffness, no limb pain or swelling  Integumentary: No complaints of skin rash or skin lesions, no itching, no skin wound, no dry skin  Neurological: No compliants of headache, no confusion, no convulsions, no numbness or tingling, no dizziness or fainting, no limb weakness, no difficulty walking  Psychiatric: Is not suicidal, no sleep disturbances, no anxiety or depression, no change in personality, no emotional problems  Endocrine: No complaints of proptosis, no hot flashes, no muscle weakness, no erectile dysfunction, no deepening of the voice, no feelings of weakness  Hematologic/Lymphatic: No complaints of swollen glands, no swollen glands in the neck, does not bleed easily, no easy bruising  Active Problems    1  Advanced care planning/counseling discussion (V65 49) (Z71 89)   2  Alcohol use (V49 89) (Z78 9)   3  Cancer related pain (338 3) (G89 3)   4  Closed fracture of body of thoracic vertebra (805 2) (S22 009A)   5  Dysphagia (787 20) (R13 10)   6  Encounter for screening colonoscopy (V76 51) (Z12 11)   7  Gastric cancer (151 9) (C16 9)   8  GERD (gastroesophageal reflux disease) (530 81) (K21 9)   9  Mediastinal adenopathy (785 6) (R59 0)   10  Non-traumatic compression fracture of T8 thoracic vertebra (733 13) (M48 54XA)   11  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) (C79 51)   12  Other constipation (564 09) (K59 09)   13  Under care of palliative care specialist (V66 7) (Z51 5)    Past Medical History    1   History of Atypical chest pain (786 59) (R07 89)   2  History of candidiasis of mouth (V12 09) (Z86 19)   3  History of nausea and vomiting (V12 79) (P39 682)    Surgical History    1  History of Knee Surgery   2  History of Percutaneous Portal Vein Catheter Placement  Surgical History Reviewed: The surgical history was reviewed and updated today  Family History    1  Family history of cardiac disorder (V17 49) (Z82 49)  Family History Reviewed: The family history was reviewed and updated today  Social History    · Alcohol use (V49 89) (Z78 9)   · Coffee   · History of Current every day smoker (305 1) (F17 200)   · Daily alcohol use   ·    · No drug use   · Occupation   · Two children  Social History Reviewed: The social history was reviewed and updated today  Current Meds   1  FentaNYL 50 MCG/HR Transdermal Patch 72 Hour; APPLY 1 PATCH EVERY 3 DAYS; Therapy: 25ZJH1082 to (Evaluate:10Mar2016); Last Rx:54Xbk1356 Ordered   2  Lactulose 20 GM/30ML Oral Solution; 30ml by mouth up to 4 times a day for constipation   as needed; Therapy: 91IGC7172 to (Matias Camargo)  Requested for: 48Euk9611; Last   Rx:56Mpn7505 Ordered   3  Lidocaine-Prilocaine 2 5-2 5 % External Cream; APPLY TO TREATMENT AREA 1 HOUR   PRIOR TO TREATMENT; Therapy: 93Udb5711 to (Last Rx:89Ikr4962)  Requested for: 69Ffe8580 Ordered   4  OxyCODONE HCl - 5 MG Oral Tablet; TAKE 1 TABLET EVERY 4 HOURS AS NEEDED   FOR PAIN;   Therapy: 74QDB6063 to (Evaluate:05Mar2016); Last Rx:64Gwi2206 Ordered   5  Prochlorperazine Maleate 10 MG Oral Tablet; TAKE 1 TABLET EVERY 6 HOURS AS   NEEDED FOR NAUSEA; Therapy: 98Ngm5331 to (Evaluate:12Oct2015)  Requested for: 22LSD0877; Last   Rx:82Zrx8313 Ordered   6  Protonix 40 MG Oral Tablet Delayed Release; TAKE 1 TABLET DAILY; Therapy: 63MHL6534 to (Evaluate:31Xos1602) Recorded   7  Xarelto 20 MG Oral Tablet; take 1 tablet daily with food;    Therapy: 58CNA2867 to (Evaluate:29Ata3343) Recorded  Medication List Reviewed: The medication list was reviewed and updated today  Allergies    1  No Known Drug Allergies    Vitals  Vital Signs [Data Includes: Current Encounter]    Recorded: 07RJT5000 03:55PM   Temperature 98 4 F   Heart Rate 104   Respiration 18   Systolic 205   Diastolic 82   Height 5 ft 8 in   Weight 205 lb    BMI Calculated 31 17   BSA Calculated 2 07   O2 Saturation 99   Pain Scale 0     Physical Exam    Constitutional   General appearance: No acute distress, well appearing and well nourished  Eyes   Conjunctiva and lids: No swelling, erythema, or discharge  Pupils and irises: Equal, round and reactive to light  Ears, Nose, Mouth, and Throat   External inspection of ears and nose: Normal     Nasal mucosa, septum, and turbinates: Normal without edema or erythema  Oropharynx: Normal with no erythema, edema, exudate or lesions  Pulmonary   Respiratory effort: No increased work of breathing or signs of respiratory distress  Auscultation of lungs: Clear to auscultation, equal breath sounds bilaterally, no wheezes, no rales, no rhonci  Cardiovascular   Palpation of heart: Normal PMI, no thrills  Auscultation of heart: Normal rate and rhythm, normal S1 and S2, without murmurs  Examination of extremities for edema and/or varicosities: Normal     Abdomen   Abdomen: Non-tender, no masses  Liver and spleen: No hepatomegaly or splenomegaly  Lymphatic   Palpation of lymph nodes in neck: No lymphadenopathy  Musculoskeletal   Gait and station: Normal     Digits and nails: Normal without clubbing or cyanosis  Inspection/palpation of joints, bones, and muscles: Normal     Skin   Skin and subcutaneous tissue: Normal without rashes or lesions  Neurologic   Cranial nerves: Cranial nerves 2-12 intact  Sensation: No sensory loss      Psychiatric   Orientation to person, place and time: Normal     Mood and affect: Normal           ECOG Zero Results/Data  Results   (1) COMPREHENSIVE METABOLIC PANEL 90AEA5201 30:81TY Maurice Restrepo    Order Number: [id number]      National Kidney Disease Education Program recommendations are as follows:  GFR calculation is accurate only with a steady state creatinine  Chronic Kidney disease less than 60 ml/min/1 73 sq  meters  Kidney failure less than 15 ml/min/1 73 sq  meters  Test Name Result Flag Reference   GLUCOSE,RANDM 93 mg/dL     If the patient is fasting, the ADA then defines impaired fasting glucose as > 100 mg/dL and diabetes as > or equal to 123 mg/dL     SODIUM 139 mmol/L  136-145   POTASSIUM 4 3 mmol/L  3 5-5 3   CHLORIDE 106 mmol/L  100-108   CARBON DIOXIDE 25 mmol/L  21-32   ANION GAP (CALC) 8 mmol/L  4-13   BLOOD UREA NITROGEN 11 mg/dL  5-25   CREATININE 0 76 mg/dL  0 60-1 30   Standardized to IDMS reference method   CALCIUM 8 3 mg/dL  8 3-10 1   BILI, TOTAL 0 22 mg/dL  0 20-1 00   ALK PHOSPHATAS 149 U/L H    ALT (SGPT) 25 U/L  12-78   AST(SGOT) 21 U/L  5-45   ALBUMIN 3 4 g/dL L 3 5-5 0   TOTAL PROTEIN 6 3 g/dL L 6 4-8 2   eGFR Non-African American      >60 0 ml/min/1 73sq m     (1) CBC/PLT/DIFF 59XDD6817 04:10PM Maurice Restrepo    Order Number: [id number]     Order Number: [id number]     Test Name Result Flag Reference   WBC COUNT 4 68 Thousand/uL  4 31-10 16   RBC COUNT 3 93 Million/uL  3 88-5 62   HEMOGLOBIN 13 2 g/dL  12 0-17 0   HEMATOCRIT 39 1 %  36 5-49 3    fL H 82-98   MCH 33 6 pg  26 8-34 3   MCHC 33 8 g/dL  31 4-37 4   RDW 15 2 % H 11 6-15 1   MPV 10 4 fL  8 9-12 7   PLATELET COUNT 471 Thousands/uL  149-390   nRBC AUTOMATED 0 /100 WBCs     NEUTROPHILS RELATIVE PERCENT 65 %  43-75   LYMPHOCYTES RELATIVE PERCENT 8 % L 14-44   MONOCYTES RELATIVE PERCENT 25 % H 4-12   EOSINOPHILS RELATIVE PERCENT 2 %  0-6   BASOPHILS RELATIVE PERCENT 0 %  0-1   NEUTROPHILS ABSOLUTE COUNT 2 99 Thousands/µL  1 85-7 62   LYMPHOCYTES ABSOLUTE COUNT 0 38 Thousands/µL L 0 60-4 47   MONOCYTES ABSOLUTE COUNT 1 19 Thousand/µL  0 17-1 22   EOSINOPHILS ABSOLUTE COUNT 0 09 Thousand/µL  0 00-0 61   BASOPHILS ABSOLUTE COUNT 0 01 Thousands/µL  0 00-0 10     * MRI LUMBAR SPINE WO CONTRAST [id number] 04:28PM Bogdan Alvarado     Test Name Result Flag Reference   MRI LUMBAR SPINE [address] (Report)     This is a summary report  The complete report is available in the patient's medical record  If you cannot access the medical record, please contact the sending organization for a detailed fax or copy  MRI LUMBAR SPINE WITHOUT CONTRAST     INDICATION: Back pain  History of carcinoma  COMPARISON: 9/3/2015  TECHNIQUE: Sagittal T1, sagittal T2, sagittal inversion recovery, axial T1 and axial T2, coronal T2       IMAGE QUALITY: Diagnostic     FINDINGS:     ALIGNMENT AND MARROW: Grade 1-2 anterior spondylolisthesis of L5 upon S1  No scoliosis  Several marrow replacing lesions are identified within the lumbar vertebral bodies including the L5 and S1 levels  The L5 vertebral body now demonstrates large    area of heterogeneous marrow replacement anteriorly, consistent with previously seen osseous metastatic disease  This has progressed with surrounding fatty marrow and slight loss of height of the L5 vertebral body  Similar enlargement of a marrow    replacing lesion within the inferior aspect of S1  Heterogeneous marrow lesion within the L2 vertebral body anteriorly  Large Schmorl's node type lesion within the superior aspect of L1 likely represents pathologic Schmorl's nodes secondary to    underlying metastasis  DISTAL CORD AND CONUS: Normal size and signal within the distal cord and conus  The conus ends at the L1 level  PARASPINAL SOFT TISSUES: Paraspinal soft tissues are unremarkable  SACRUM: Enlarging S1 lesion as described above consistent with worsening metastasis  LOWER THORACIC DISC SPACES: See separate MRI thoracic spine dictated yesterday       LUMBAR DISC SPACES: Diffuse degenerative disc disease with multifocal annular bulging  L1-L2: Mild annular bulging without canal stenosis or foraminal narrowing  L2-L3: Mild annular bulging  Broad-based left foraminal and lateral disc protrusion with mild to moderate left foraminal narrowing, unchanged  L3-L4: Mild diffuse annular bulging with broad-based left foraminal and lateral disc protrusion  Stable left greater than right foraminal narrowing and mild canal stenosis  L4-L5: Mild diffuse annular bulging  Moderate left greater than right facet hypertrophic degenerative change  Stable mild canal stenosis and foraminal narrowing  L5-S1: Stable anterolisthesis of L5 upon S1 with chronic spondylolysis  Moderate diffuse annular bulging with uncovering of the cephalad disc margin and small broad-based right foraminal disc protrusion  Left greater than right facet hypertrophic    degenerative change  No canal stenosis  Moderately severe left and severe right foraminal narrowing, unchanged  IMPRESSION:     Progression of osseous metastatic disease involving L1, L2, L5 and S1  The osseous metastatic lesions at L1 and L5 have no resulting in pathologic Schmorl's nodes involving the superior endplates  Diffuse lumbar spondylitic degenerative change with canal stenosis and foraminal narrowing, stable compared to the previous examination  Significant L5-S1 foraminal narrowing, right greater than left is unchanged  ##imslh##imslh           Signed by:   Hamlet Jarvis DO   1/15/16     * MRI THORACIC SPINE WO CONTRAST [id number] 04:39PM Stephanie Otoole     Test Name Result Flag Reference   MRI THORACIC SPINE [address] (Report)     This is a summary report  The complete report is available in the patient's medical record  If you cannot access the medical record, please contact the sending organization for a detailed fax or copy  MRI THORACIC SPINE WITHOUT CONTRAST     INDICATION: Back pain  History of GI carcinoma  COMPARISON: 9/3/2015     TECHNIQUE: Sagittal T1, sagittal T2, sagittal inversion recovery, axial T2, axial 2D merge  IMAGE QUALITY: Diagnostic  FINDINGS:     ALIGNMENT AND MARROW: Normal alignment  No subluxation  The T8 vertebral body demonstrates a moderate compression fracture with no normal marrow signal noted within the vertebral body  There is loss of height since the previous examination where there   was only a mild compression fracture  Marrow signal within the pedicles and posterior elements appears intact  Small hemangioma within the T2 vertebral body  From T5 through T11 there is prominent fatty marrow signal suggesting sequela of prior radiation therapy  The L1 vertebral body demonstrates a large Schmorl's node within the posterior aspect of the vertebral body arising from the superior endplate  THORACIC CORD: Thoracic cord is diffusely normal in signal      PARAVERTEBRAL SOFT TISSUES: Paraspinal soft tissues are unremarkable  THORACIC DEGENERATIVE CHANGE: Multilevel mild thoracic degenerative disc disease  Slight annular bulging  Small central disc protrusions within the upper thoracic spine  Mild endplate and facet degenerative change  Only mild canal stenosis noted    without cord compression  Mild foraminal narrowing at the T8-T9 level  Worsening loss of height of the pathologic T8 compression fracture seen on the previous examination without extraosseous extension into the paraspinal soft tissues are epidural space  Sequela of prior radiation therapy from T4 through T11 with prominent   fatty marrow signal      Interval development of a large Schmorl's node within the L1 superior endplate  Mild thoracic degenerative disc disease  Only mild canal stenosis without cord compression   Mild foraminal narrowing at T8-T9      * MRI CERVICAL SPINE WO CONTRAST [id number] 04:31PM Pastora Ivory     Test Name Result Flag Reference   MRI CERVICAL SPINE [address] (Report) This is a summary report  The complete report is available in the patient's medical record  If you cannot access the medical record, please contact the sending organization for a detailed fax or copy  MRI CERVICAL SPINE WITHOUT CONTRAST     INDICATION: Low back pain  COMPARISON: 9/18/2015  TECHNIQUE: Sagittal T1, sagittal T2, sagittal inversion recovery, axial T2, axial 2-D merge  IMAGE QUALITY: Diagnostic     FINDINGS:     ALIGNMENT: Normal alignment of the cervical spine  No compression fracture  No subluxation  No scoliosis  MARROW SIGNAL: T2 hemangioma noted  Chronic endplate changes noted within the C6 vertebral body  CERVICAL AND VISUALIZED THORACIC CORD: Normal signal within the visualized cord  PREVERTEBRAL AND PARASPINAL SOFT TISSUES: Prevertebral and paraspinal soft tissues are unremarkable  VISUALIZED POSTERIOR FOSSA: The visualized posterior fossa demonstrates no abnormal signal      CERVICAL DISC SPACES:        C2-C3: Normal      C3-C4: Slight uncinate joint hypertrophic degenerative change  Normal disc height  Slight canal stenosis  Mild to moderate foraminal narrowing, unchanged  C4-C5: Mild left uncinate joint hypertrophic degenerative change  No canal stenosis  Moderate left and mild right foraminal narrowing, unchanged  C5-C6: Mild left foraminal disc osteophyte complex  No canal stenosis or right foraminal narrowing  Moderate left foraminal narrowing, similar to the previous examination  C6-C7: Normal disc height and signal  No disc herniation or canal stenosis  Mild foraminal narrowing  C7-T1: Normal      UPPER THORACIC DISC SPACES: Normal        Mild cervical spondylitic degenerative change  Foraminal narrowing noted at C3-4 bilaterally  Findings stable compared to the previous exam      C4-5 and C5-6 demonstrate moderate left foraminal narrowing, slightly more pronounced at the C4-5 level   This is stable compared to the previous examination  No cord compression  * CT CHEST, ABD, PELVIS W/CONTRAST [id number] 10:07AM Freedom Dears     Test Name Result Flag Reference   CT CHEST,ABD, PELVIS WITH CONTRAST (Report)     [place] [address] [address];;[place];[id number]   01/08/2016 1231   01/08/2016 1249       CT CHEST, ABDOMEN AND PELVIS WITH IV CONTRAST     INDICATION- Follow-up stomach cancer  Status post chemoradiation     COMPARISON- Chest CT from 10/20/2015, pelvic CT from 9/26/2015, and   abdomen and pelvic CT from 9/2/2015  TECHNIQUE- CT examination of the chest, abdomen and pelvis was   performed  Examination was performed utilizing techniques to minimize   radiation dose, including the use of dose reduction software  Axial,   sagittal, and coronal reformatted images were submitted for   interpretation  100 cc of intravenous Omnipaque 350 was administered   for this examination  Enteric contrast was administered  FINDINGS-     CHEST     LUNGS- Lungs are clear  There is no tracheal or endobronchial lesion  PLEURA- Unremarkable  HEART/GREAT VESSELS- Unremarkable for patient's age  Right-sided   Port-A-Cath remains in place  MEDIASTINUM AND RYAN- Previously seen mediastinal adenopathy has   resolved  CHEST WALL AND LOWER NECK- Unremarkable  ABDOMEN     LIVER/BILIARY TREE- Unremarkable  GALLBLADDER- No calcified gallstones  No pericholecystic inflammatory   change  SPLEEN- Unremarkable  PANCREAS- Unremarkable  ADRENAL GLANDS- Unremarkable  KIDNEYS/URETERS- Unremarkable  No hydronephrosis  STOMACH AND BOWEL- Unremarkable  APPENDIX- No findings to suggest appendicitis  ABDOMINOPELVIC CAVITY- No ascites or free intraperitoneal air  No   lymphadenopathy  VESSELS- Unremarkable for patient's age  PELVIS     REPRODUCTIVE ORGANS- Unremarkable for patient's age  URINARY BLADDER- Unremarkable  ABDOMINAL WALL/INGUINAL REGIONS- Unremarkable  OSSEOUS STRUCTURES- Previously seen numerous lytic metastases   throughout the skeleton has now become sclerotic, and many more lesions   are now visible than previously  This change is likely due to tumor   response to treatment rather than progression  Moderate compression   fracture of T8 is unchanged  IMPRESSION-      Many more bone metastases are now visible than on previous studies, but   this is probably due to treatment response turning less conspicuous   lytic to more conspicous sclerotic metastases, rather than tumor   progression, particularly since patient's mediastinal adenopathy has   resolved  Transcribed on- DAYANARA Zhao MD   Reading Radiologist- DAYANARA Robb MD   Electronically Signed- DAYANARA Robb MD   Released Date Time- 01/08/16 1405   ------------------------------------------------------------------------------   [id number]^MARISA ORDAZ   [id number]^MARISA ORDAZ     Future Appointments    Date/Time Provider Specialty Site   04/05/2016 03:00 PM VIMAL Carlos   [address]     Signatures   Electronically signed by : VIMAL Farnsworth ; Mar 11 2016  4:08PM EST                       (Author)